# Patient Record
Sex: FEMALE | URBAN - METROPOLITAN AREA
[De-identification: names, ages, dates, MRNs, and addresses within clinical notes are randomized per-mention and may not be internally consistent; named-entity substitution may affect disease eponyms.]

---

## 2018-04-10 ENCOUNTER — RADIANT APPOINTMENT (OUTPATIENT)
Dept: GENERAL RADIOLOGY | Facility: CLINIC | Age: 48
End: 2018-04-10
Attending: FAMILY MEDICINE
Payer: COMMERCIAL

## 2018-04-10 ENCOUNTER — OFFICE VISIT (OUTPATIENT)
Dept: FAMILY MEDICINE | Facility: CLINIC | Age: 48
End: 2018-04-10
Payer: COMMERCIAL

## 2018-04-10 VITALS
HEART RATE: 78 BPM | OXYGEN SATURATION: 99 % | BODY MASS INDEX: 30.55 KG/M2 | DIASTOLIC BLOOD PRESSURE: 76 MMHG | RESPIRATION RATE: 18 BRPM | TEMPERATURE: 98.4 F | HEIGHT: 62 IN | WEIGHT: 166 LBS | SYSTOLIC BLOOD PRESSURE: 133 MMHG

## 2018-04-10 DIAGNOSIS — D27.0 BILATERAL DERMOID CYSTS OF OVARIES: Primary | ICD-10-CM

## 2018-04-10 DIAGNOSIS — M25.621 DECREASED RANGE OF MOTION OF RIGHT ELBOW: ICD-10-CM

## 2018-04-10 DIAGNOSIS — D27.1 BILATERAL DERMOID CYSTS OF OVARIES: Primary | ICD-10-CM

## 2018-04-10 DIAGNOSIS — S53.124S: ICD-10-CM

## 2018-04-10 NOTE — NURSING NOTE
name: Molly  Language: Peruvian   Agency: [facility]  Phone number: [phone]

## 2018-04-10 NOTE — MR AVS SNAPSHOT
After Visit Summary   4/10/2018    Kasandra Cameron    MRN: [id number]           Patient Information     Date Of Birth          1970        Visit Information        Provider Department      4/10/2018 9:00 AM Mayda Chamorro MD [facility]        Today's Diagnoses     Bilateral dermoid cysts of ovaries    -  1    Decreased range of motion of right elbow          Care Instructions    Here is the plan from today's visit    1. Bilateral dermoid cysts of ovaries  - OB/GYN REFERRAL - INTERNAL    2. Decreased range of motion of right elbow  - XR ELBOW RT G/E 3 VW; Future  - [facility] INTERNAL REFERRAL    Thank you for coming to [facility] today.  Lab Testing:  **If you had lab testing today and your results are reassuring or normal they will be mailed to you or sent through PowerbyProxi within 7 days.   **If the lab tests need quick action we will call you with the results.  The phone number we will call with results is # [phone] (home) . If this is not the best number please call our clinic and change the number.  Medication Refills:  If you need any refills please call your pharmacy and they will contact us.   If you need to  your refill at a new pharmacy, please contact the new pharmacy directly. The new pharmacy will help you get your medications transferred faster.   Scheduling:  If you have any concerns about today's visit or wish to schedule another appointment please call our office during normal business hours [phone] (8-5:00 M-F)  If a referral was made to a [facility] and you don't get a call from central scheduling please call [phone].  If a Mammogram was ordered for you at [facility] call [phone] to schedule or change your appointment.  If you had an XRay/CT/Ultrasound/MRI ordered the number is [phone] to schedule or change your radiology appointment.   Medical Concerns:  If you have  urgent medical concerns please call [phone] at any time of the day.    Mayda Chamorro MD            Follow-ups after your visit        Additional Services     OB/GYN REFERRAL - INTERNAL       Your provider has referred you to:  [facility]: [facility] [phone]   [email or web address]    Please be aware that coverage of these services is subject to the terms and limitations of your health insurance plan.  Call member services at your health plan with any benefit or coverage questions.      Please bring the following with you to your appointment:    (1) Any X-Rays, CTs or MRIs which have been performed.  Contact the facility where they were done to arrange for  prior to your scheduled appointment.   (2) List of current medications   (3) This referral request   (4) Any documents/labs given to you for this referral            [facility] INTERNAL REFERRAL       Reason: unable to flex right elbow  Urgency of Appointment: Next Available  Length of Problem: Chronic (3 months or longer since onset): Ordering Provider - please ensure that radiographs or imaging is available within the past 12 months or obtain new imaging if concern for bone or joint.  What are you requesting be done? Diagnosis and Management Recommendations                  Your next 10 appointments already scheduled     Apr 17, 2018 10:00 AM CDT   RETURN ORTHO with Estefany Cope MD   [facility] ([facility])    [address] [place],  [address]  [facility] [address]   [phone]            Apr 24, 2018  1:30 PM CDT   New Patient Visit with Eileen Hernandez MD   [facility] ([facility])    [address] [facility] [address]  [address]  [facility] [address] [phone]              Future tests that were ordered for you today     Open Future Orders      "   Priority Expected Expires Ordered    XR ELBOW RT G/E 3 VW Routine 4/10/2018 4/10/2019 4/10/2018            Who to contact     Please call your clinic at [phone] to:    Ask questions about your health    Make or cancel appointments    Discuss your medicines    Learn about your test results    Speak to your doctor            Additional Information About Your Visit        MyChart Information     ReInnervate is an electronic gateway that provides easy, online access to your medical records. With ReInnervate, you can request a clinic appointment, read your test results, renew a prescription or communicate with your care team.     To sign up for ReInnervate visit the website at [email or web address]   You will be asked to enter the access code listed below, as well as some personal information. Please follow the directions to create your username and password.     Your access code is: [email or web address]  Expires: 2018 10:11 AM     Your access code will  in 90 days. If you need help or a new code, please contact your [facility] or call [phone] for assistance.        Care EveryWhere ID     This is your Care EveryWhere ID. This could be used by other organizations to access your [place] medical records  [id number]        Your Vitals Were     Pulse Temperature Respirations Height Pulse Oximetry BMI (Body Mass Index)    78 98.4  F (36.9  C) (Oral) 18 5' 2\" (157.5 cm) 99% 30.36 kg/m2       Blood Pressure from Last 3 Encounters:   04/10/18 133/76    Weight from Last 3 Encounters:   04/10/18 166 lb (75.3 kg)              We Performed the Following     OB/GYN REFERRAL - INTERNAL     [facility] INTERNAL REFERRAL        Primary Care Provider Fax #    Physician No Ref-Primary [phone]       No address on file        Equal Access to Services     DELANO DUNBAR AH: Tremaine Quiroz, camilla nieto, yonas bhakta, candis deutsch " lazaire macias. So [facility] [phone].    ATENCIÓN: Si habla mario, tiene a whitaker disposición servicios gratuitos de asistencia lingüística. Amy al [phone].    We comply with applicable federal civil rights laws and [place] laws. We do not discriminate on the basis of race, color, national origin, age, disability, sex, sexual orientation, or gender identity.            Thank you!     Thank you for choosing [facility]  for your care. Our goal is always to provide you with excellent care. Hearing back from our patients is one way we can continue to improve our services. Please take a few minutes to complete the written survey that you may receive in the mail after your visit with us. Thank you!             Your Updated Medication List - Protect others around you: Learn how to safely use, store and throw away your medicines at www.disposemymeds.org.          This list is accurate as of 4/10/18 10:11 AM.  Always use your most recent med list.                   Brand Name Dispense Instructions for use Diagnosis    TYLENOL PO

## 2018-04-10 NOTE — PATIENT INSTRUCTIONS
Here is the plan from today's visit    1. Bilateral dermoid cysts of ovaries  - OB/GYN REFERRAL - INTERNAL    2. Decreased range of motion of right elbow  - XR ELBOW RT G/E 3 VW; Future  - [facility] INTERNAL REFERRAL    Thank you for coming to [facility] today.  Lab Testing:  **If you had lab testing today and your results are reassuring or normal they will be mailed to you or sent through Jamclouds within 7 days.   **If the lab tests need quick action we will call you with the results.  The phone number we will call with results is # [phone] (home) . If this is not the best number please call our clinic and change the number.  Medication Refills:  If you need any refills please call your pharmacy and they will contact us.   If you need to  your refill at a new pharmacy, please contact the new pharmacy directly. The new pharmacy will help you get your medications transferred faster.   Scheduling:  If you have any concerns about today's visit or wish to schedule another appointment please call our office during normal business hours [phone] (8-5:00 M-F)  If a referral was made to a [facility] and you don't get a call from central scheduling please call [phone].  If a Mammogram was ordered for you at [facility] call [phone] to schedule or change your appointment.  If you had an XRay/CT/Ultrasound/MRI ordered the number is [phone] to schedule or change your radiology appointment.   Medical Concerns:  If you have urgent medical concerns please call [phone] at any time of the day.    Mayda Chamorro MD      Patient is scheduled next Tuesday!           Thanks!     Maida     4/24/18

## 2018-04-11 PROBLEM — D27.0 BILATERAL DERMOID CYSTS OF OVARIES: Status: ACTIVE | Noted: 2018-04-11

## 2018-04-11 PROBLEM — D27.1 BILATERAL DERMOID CYSTS OF OVARIES: Status: ACTIVE | Noted: 2018-04-11

## 2018-04-11 PROBLEM — S53.124S: Status: ACTIVE | Noted: 2018-04-11

## 2018-04-11 ASSESSMENT — ENCOUNTER SYMPTOMS
WHEEZING: 0
ARTHRALGIAS: 0
NECK PAIN: 0
NERVOUS/ANXIOUS: 0
DIZZINESS: 0
DYSURIA: 0
POLYDIPSIA: 0
NUMBNESS: 0
UNEXPECTED WEIGHT CHANGE: 0
ABDOMINAL PAIN: 0
NAUSEA: 0
VOMITING: 0
RHINORRHEA: 0
CONSTIPATION: 0
DYSPHORIC MOOD: 0
SHORTNESS OF BREATH: 0
WEAKNESS: 0
EYE PAIN: 0
COUGH: 0
SORE THROAT: 0
PALPITATIONS: 0
SLEEP DISTURBANCE: 0
BACK PAIN: 0
FEVER: 0
BRUISES/BLEEDS EASILY: 0
CHILLS: 0
HEADACHES: 0
DIARRHEA: 0

## 2018-04-16 ENCOUNTER — HEALTH MAINTENANCE LETTER (OUTPATIENT)
Age: 48
End: 2018-04-16

## 2018-04-17 ENCOUNTER — OFFICE VISIT (OUTPATIENT)
Dept: FAMILY MEDICINE | Facility: CLINIC | Age: 48
End: 2018-04-17
Payer: COMMERCIAL

## 2018-04-17 VITALS
TEMPERATURE: 98.4 F | HEART RATE: 75 BPM | DIASTOLIC BLOOD PRESSURE: 94 MMHG | WEIGHT: 165 LBS | RESPIRATION RATE: 16 BRPM | SYSTOLIC BLOOD PRESSURE: 139 MMHG | BODY MASS INDEX: 30.18 KG/M2 | OXYGEN SATURATION: 97 %

## 2018-04-17 DIAGNOSIS — S53.124S: Primary | ICD-10-CM

## 2018-04-17 NOTE — NURSING NOTE
used this visit:  Name: Jose BUITRAGO  Language: Turkmen  Agency: [facility]  Phone:[phone]  Minnie Westbrook CMA

## 2018-04-17 NOTE — MR AVS SNAPSHOT
After Visit Summary   4/17/2018    Kasandra Cameron    MRN: [id number]           Patient Information     Date Of Birth          1970        Visit Information        Provider Department      4/17/2018 10:00 AM Estefany Cope MD [facility]        Today's Diagnoses     Posterior dislocation of elbow, closed, right, sequela    -  1      Care Instructions    Here is the plan from today's visit    1. Posterior dislocation of elbow, closed, right, sequela  MRI of elbow, then to see an orthopedic hand surgeon to discuss all options.   - MR Elbow Right w/o Contrast; Future  - ORTHOPEDICS ADULT REFERRAL    Please call or return to clinic if your symptoms don't go away.    Follow up plan  Please make a clinic appointment for follow up with your primary physician Mayda Chamorro MD as needed.     Thank you for coming to [facility] today.  Lab Testing:  **If you had lab testing today and your results are reassuring or normal they will be mailed to you or sent through [email or web address] within 7 days.   **If the lab tests need quick action we will call you with the results.  The phone number we will call with results is # [phone] (home) . If this is not the best number please call our clinic and change the number.  Medication Refills:  If you need any refills please call your pharmacy and they will contact us.   If you need to  your refill at a new pharmacy, please contact the new pharmacy directly. The new pharmacy will help you get your medications transferred faster.   Scheduling:  If you have any concerns about today's visit or wish to schedule another appointment please call our office during normal business hours [phone] (8-5:00 M-F)  If a referral was made to a [facility] and you don't get a call from central scheduling please call [phone].  If a Mammogram was ordered for you at [facility] call [phone] to schedule or  change your appointment.  If you had an XRay/CT/Ultrasound/MRI ordered the number is [phone] to schedule or change your radiology appointment.   Medical Concerns:  If you have urgent medical concerns please call [phone] at any time of the day.    Esetfany Cope MD            Follow-ups after your visit        Additional Services     ORTHOPEDICS ADULT REFERRAL       Your provider has referred you to: PREFERRED PROVIDERS: [facility]    Please be aware that coverage of these services is subject to the terms and limitations of your health insurance plan.  Call member services at your health plan with any benefit or coverage questions.      Please bring the following to your appointment:    >>   Any x-rays, CTs or MRIs which have been performed.  Contact the facility where they were done to arrange for  prior to your scheduled appointment.    >>   List of current medications   >>   This referral request   >>   Any documents/labs given to you for this referral                  Your next 10 appointments already scheduled     Apr 24, 2018  1:30 PM CDT   New Patient Visit with Eileen Hernandez MD   [facility] ([facility])    [address] [facility] [address]  [address]  [facility] [id number] [phone]              Future tests that were ordered for you today     Open Future Orders        Priority Expected Expires Ordered    MR Elbow Right w/o Contrast Routine  4/17/2019 4/17/2018            Who to contact     Please call your clinic at [phone] to:    Ask questions about your health    Make or cancel appointments    Discuss your medicines    Learn about your test results    Speak to your doctor            Additional Information About Your Visit        Yillio Information     Yillio is an electronic gateway that provides easy, online access to your medical records. With Yillio, you can request a clinic appointment, read your test results,  renew a prescription or communicate with your care team.     To sign up for MyChart visit the website at [email or web address]   You will be asked to enter the access code listed below, as well as some personal information. Please follow the directions to create your username and password.     Your access code is: [email or web address]  Expires: 2018 10:11 AM     Your access code will  in 90 days. If you need help or a new code, please contact your [facility] or call [phone] for assistance.        Care EveryWhere ID     This is your Care EveryWhere ID. This could be used by other organizations to access your [place] medical records  [id number]        Your Vitals Were     Pulse Temperature Respirations Last Period Pulse Oximetry BMI (Body Mass Index)    75 98.4  F (36.9  C) (Oral) 16 2018 (Exact Date) 97% 30.18 kg/m2       Blood Pressure from Last 3 Encounters:   18 (!) 139/94   04/10/18 133/76    Weight from Last 3 Encounters:   18 165 lb (74.8 kg)   04/10/18 166 lb (75.3 kg)              We Performed the Following     ORTHOPEDICS ADULT REFERRAL        Primary Care Provider Office Phone # Fax #    Mayda Chamorro [phone]       [address] [facility] [address]        Equal Access to Services     DELANO DUNBAR AH: Hadii linda solo hadproo Sokristen, waaxda luqadaha, qaybta kaalmada yony, candis david . So [facility] [phone].    ATENCIÓN: Si habla español, tiene a whitaker disposición servicios gratuitos de asistencia lingüística. Amy al [phone].    We comply with applicable federal civil rights laws and [place] laws. We do not discriminate on the basis of race, color, national origin, age, disability, sex, sexual orientation, or gender identity.            Thank you!     Thank you for choosing [facility]  for your care. Our goal is always to provide you with excellent care.  Hearing back from our patients is one way we can continue to improve our services. Please take a few minutes to complete the written survey that you may receive in the mail after your visit with us. Thank you!             Your Updated Medication List - Protect others around you: Learn how to safely use, store and throw away your medicines at www.disposemymeds.org.          This list is accurate as of 4/17/18 10:44 AM.  Always use your most recent med list.                   Brand Name Dispense Instructions for use Diagnosis    TYLENOL PO

## 2018-04-21 ASSESSMENT — ENCOUNTER SYMPTOMS
FEVER: 0
COLOR CHANGE: 0
JOINT SWELLING: 0
NECK PAIN: 0
ARTHRALGIAS: 0

## 2018-04-22 NOTE — PROGRESS NOTES
HPI:       Kasandra Cameron is a 47 year old who presents for the following  Patient presents with:  Elbow Pain: R. elbow stiffness. Unable to flex and move it. x's 7 yrs due to a fall. Trying massages and stretching. Believes she dislocated it.No pain.  Results: x-ray results from 4-10-18      Difficulty moving joint      Onset: 7 years ago    Injury?  Yes Details: Patient fell from a bunk bed.  She fell onto her outstretched arm.    Description:   Location(s): Right elbow  Character: No pain.    Intensity: N/A    Progression of Symptoms: same    Accompanying Signs & Symptoms:  Other symptoms: none    History:   Previous similar pain: no      Worsened by    Overuse?: no  Morning Stiffness?:no    Alleviating factors:  Improved by: nothing  Therapies Tried and outcome: Nothing.  Patient never had surgery or reduction of elbow joint after injury.    A  was used for  this visit.      Problem, Medication and Allergy Lists were reviewed and are current.  Patient is an established patient of this clinic.         Review of Systems:   Review of Systems   Constitutional: Negative for fever.   Musculoskeletal: Negative for arthralgias, joint swelling and neck pain.   Skin: Negative for color change and rash.             Physical Exam:   No data found.    Body mass index is 30.18 kg/(m^2).  Vitals were reviewed and were normal     Physical Exam   Constitutional: She appears well-developed and well-nourished. No distress.   Musculoskeletal: She exhibits no edema.   Neurological: She is alert.   Skin: No rash noted. She is not diaphoretic.   Nursing note and vitals reviewed.    Right Elbow Exam     Tenderness   None    Range of Motion   Extension:  Abnormal  Flexion:      Normal  Pronation:   Abnormal  Supination: Abnormal    Muscle Strength   Wrist Extension: 5/5  Wrist Flexion:     5/5  :                   5/5  Pronation:           5/5  Supination:         5/5          Results:   3 views right  elbow radiographs 4/10/2018 10:40 AM     History: injury 7 years ago, unable to flex elbow; Decreased range of  motion of right elbow. As per chart review: Fell 7 years ago and had  pain initially pass in standard pain has been resolved. Patient  continue to have limited range of motion     Comparison: None     Findings:     AP and lateral views of the right elbow were obtained. Marked medial  and posteriorly dislocated radial head and is now medial to the ulna.  The ulna is also posteriorly dislocated relative to the trochlea.  Trochlear groove demonstrate surface irregularity. There are bony  fragments medial to the medial epicondyle and proximal to the  capitulum, likely from prior comminuted fracture from medial  epicondyle fracture.          Impression:  1. Radial head is marked medial and posteriorly dislocated and is now  medial to the ulna.  2. Posterior dislocation of ulna  3. Multiple bony fragments in the joint space likely resulted from  prior fracture. See above for detail     I have personally reviewed the examination and initial interpretation  and I agree with the findings.     HERON ADKINS MD    Assessment and Plan     1. Posterior dislocation of elbow, closed, right, sequela  Patient's physical exam and x-ray are consistent with a dislocation of the elbow, likely fracture at the time of injury, 7 years ago.  Patient would have benefited from a reduction of the dislocation after the incident.  However, given the length of time from injury to presentation, patient would likely need surgery.  Patient does not appear to have any compromise of nerve or blood supply to right hand.  However an MRI is ordered for further evaluation.  Patient is not sure if she would have a surgery, but would like to meet with an orthopedic surgeon to discuss options.  Patient works as a  at a hotel, and uses her upper extremity often, but has been doing well given significant decrease in range of motion.  Patient  does not need to follow-up with sports medicine clinic, but rather should discuss ongoing cares with her primary physician, orthopedics referral placed.  - MR Elbow Right w/o Contrast; Future  - ORTHOPEDICS ADULT REFERRAL  There are no discontinued medications.  Options for treatment and follow-up care were reviewed with the patient. Kasandra Cameron  engaged in the decision making process and verbalized understanding of the options discussed and agreed with the final plan.    Estefany Cope MD

## 2018-04-24 ENCOUNTER — OFFICE VISIT (OUTPATIENT)
Dept: OBGYN | Facility: CLINIC | Age: 48
End: 2018-04-24
Payer: COMMERCIAL

## 2018-04-24 ENCOUNTER — HOSPITAL ENCOUNTER (OUTPATIENT)
Dept: MRI IMAGING | Facility: CLINIC | Age: 48
Discharge: HOME | End: 2018-04-24
Attending: FAMILY MEDICINE | Admitting: OBSTETRICS & GYNECOLOGY
Payer: COMMERCIAL

## 2018-04-24 VITALS
SYSTOLIC BLOOD PRESSURE: 155 MMHG | HEART RATE: 73 BPM | WEIGHT: 165.5 LBS | BODY MASS INDEX: 30.46 KG/M2 | DIASTOLIC BLOOD PRESSURE: 90 MMHG | HEIGHT: 62 IN

## 2018-04-24 DIAGNOSIS — S53.124S: ICD-10-CM

## 2018-04-24 DIAGNOSIS — D27.1 DERMOID CYST OF BOTH OVARIES: Primary | ICD-10-CM

## 2018-04-24 DIAGNOSIS — D27.0 DERMOID CYST OF BOTH OVARIES: Primary | ICD-10-CM

## 2018-04-24 PROCEDURE — 73221 MRI JOINT UPR EXTREM W/O DYE: CPT | Mod: RT

## 2018-04-24 PROCEDURE — G0463 HOSPITAL OUTPT CLINIC VISIT: HCPCS

## 2018-04-24 PROCEDURE — T1013 SIGN LANG/ORAL INTERPRETER: HCPCS | Mod: U3,ZF

## 2018-04-24 RX ORDER — PHENAZOPYRIDINE HYDROCHLORIDE 100 MG/1
200 TABLET, FILM COATED ORAL ONCE
Status: CANCELLED | OUTPATIENT
Start: 2018-04-24 | End: 2018-04-24

## 2018-04-24 NOTE — PATIENT INSTRUCTIONS
Our clinic will call you with surgery dates in the next couple of days    Schedule a pre-op visit with your doctor and review your blood pressures    Talk to your work, and get any paperwork that may need to be completed prior to surgery

## 2018-04-24 NOTE — MR AVS SNAPSHOT
"              After Visit Summary   2018    Kasandra Cameron    MRN: [id number]           Patient Information     Date Of Birth          1970        Visit Information        Provider Department      2018 1:15 PM Brendan Stoddard Emily B, MD [facility]        Today's Diagnoses     Dermoid cyst of both ovaries    -  1      Care Instructions    Our clinic will call you with surgery dates in the next couple of days    Schedule a pre-op visit with your doctor and review your blood pressures    Talk to your work, and get any paperwork that may need to be completed prior to surgery          Follow-ups after your visit        Who to contact     Please call your clinic at [phone] to:    Ask questions about your health    Make or cancel appointments    Discuss your medicines    Learn about your test results    Speak to your doctor            Additional Information About Your Visit        MyChart Information     [employer] is an electronic gateway that provides easy, online access to your medical records. With [employer], you can request a clinic appointment, read your test results, renew a prescription or communicate with your care team.     To sign up for [place] [employer] visit the website at [email or web address]   You will be asked to enter the access code listed below, as well as some personal information. Please follow the directions to create your username and password.     Your access code is: [email or web address]  Expires: 2018 10:11 AM     Your access code will  in 90 days. If you need help or a new code, please contact your [facility] or call [phone] for assistance.        Care EveryWhere ID     This is your Care EveryWhere ID. This could be used by other organizations to access your [facility] medical records  [id number]        Your Vitals Were     Pulse Height Last Period BMI (Body Mass Index)          73 1.575 m (5' 2\") 2018 " (Exact Date) 30.27 kg/m2         Blood Pressure from Last 3 Encounters:   04/24/18 155/90   04/17/18 (!) 139/94   04/10/18 133/76    Weight from Last 3 Encounters:   04/24/18 75.1 kg (165 lb 8 oz)   04/17/18 74.8 kg (165 lb)   04/10/18 75.3 kg (166 lb)              We Performed the Following     Lauren-Operative Worksheet (Diagnostic Laparoscopy)        Primary Care Provider Office Phone # Fax #    Mayda Al Chamorro [phone]       [address] [facility] [address]        Equal Access to Services     [facility]: Hadii linda Quiroz, waurida gerson, qaybta kaalmada yony, candis david . So [facility] [phone].    ATENCIÓN: Si habla español, tiene a whitaker disposición servicios gratuitos de asistencia lingüística. Llame al [phone].    We comply with applicable federal civil rights laws and [place] laws. We do not discriminate on the basis of race, color, national origin, age, disability, sex, sexual orientation, or gender identity.            Thank you!     Thank you for choosing [facility]  for your care. Our goal is always to provide you with excellent care. Hearing back from our patients is one way we can continue to improve our services. Please take a few minutes to complete the written survey that you may receive in the mail after your visit with us. Thank you!             Your Updated Medication List - Protect others around you: Learn how to safely use, store and throw away your medicines at www.disposemymeds.org.          This list is accurate as of 4/24/18  3:30 PM.  Always use your most recent med list.                   Brand Name Dispense Instructions for use Diagnosis    TYLENOL PO

## 2018-04-24 NOTE — PROGRESS NOTES
"[facility]  Gynecology Visit    Reason for Consult:  Bilateral ovarian cysts  Consulting Provider: Ashtyn    HPI:    Kasandra Cameron is a 47 year old , here for consultation regarding bilateral ovarian cysts.  The patient was seen in the ED on 18 for acute onset LLQ abdominal pain.  At that time, CT of her abdomen and pelvis was obtained and revealed bilateral ovarian cysts (7.7 cm and 10.0 cm respectively) most consistent with dermoid.  Subsequently, she established care at [facility] on 4/10/18 as instructed who sent her for referral.     Today, the patient reports her pain is improved since being discharged home with bowel regimen. Denies any pain today.  No concerns.     GYN History  LMP: LMP of 18; previous was 18.  This is first occurrence.  Periods: Normal cycles with moderate flow   STD hx: Denies  Contraception: None, has never used. Patient does not desire future fertility.  Sexual activity: Not currently   Last pap: Two years ago in [place], denies history of abnormal pap smears    OBHx  Obstetric History       T3      L3     SAB0   TAB0   Ectopic0   Multiple0   Live Births3       # Outcome Date GA Lbr Ramón/2nd Weight Sex Delivery Anes PTL Lv   3 Term     M Vag-Spont   LIVE BIRTH   2 Term     F Vag-Spont   LIVE BIRTH   1 Term     F Vag-Spont   LIVE BIRTH        Past Medical History:   Diagnosis Date     Dermoid cyst of ovary      Diverticula of intestine      HTN (hypertension)       PSHx: Denies  Meds: Denies  Allergies:  NKDA    SocHx: Denies tobacco, ETOH or drug use.  Works as a  at a hotel.  Lives with spouse and three children.     FamHx: Denies history of cancer. Denies any history of bleeding or clotting disorders, no adverse reactions to anesthesia.      ROS: No changes in weight, N/V, changes in appetite     Physical Exam  /90  Pulse 73  Ht 1.575 m (5' 2\")  Wt 75.1 kg (165 lb 8 oz)  LMP 2018 (Exact Date)  BMI 30.27 " kg/m2  Gen: Well-appearing, NAD  HEENT: Normocephalic, atraumatic  CV:  Regular rate  Non-labored breathing.  Appropriate inspiratory effort.  Abd: Soft, non-tender, non-distended.  No discrete pelvic masses appreciated given body habitus.   Ext: No LE edema, extremities warm and well perfused    Pelvic:  Patient declines    Imaging:   History: Left lower quadrant pain; history of colitis.    Technique: Volumetric helical acquisition of CT images from the lung bases through the symphysis pubis after the administration of intravenous contrast.    DOSE:    CTDIvol = 6.7 mGy.       Total DLP = 380 mGy*cm.      Contrast Media: IOHEXOL 350 MG/ML IV SOLUTION  Dose: 100 mL  Route: IV Push     Findings:     There are 2 fat-containing masses present in the pelvis, one anterior to the uterus and the other superior to the uterus as seen on axial images 125 and 107 respectively. The mass anterior to the uterus measures 6.4 x 7.7 x 6.6 cm and the mass superior to the uterus measures 7.2 x 10.0 x 8.3 cm. The mass superior to the uterus appears to originate from the left ovary and the mass anterior to the uterus appears to arise from the right ovary.    Liver: Within normal limits    Gallbladder and biliary tree: No calcified gallstones. No intra- or extrahepatic biliary ductal dilation.    Pancreas: Pancreatic body and tail appears atrophic with chunky ductal calcification (series 202, image 45 and 46)    Spleen: Within normal limits    Adrenals: Within normal limits.    Kidneys, ureters and bladder: Within normal limits.     Bowel: Normal caliber small bowel and large bowel. Diverticulosis with no evidence of diverticulitis.    Lymph nodes: No enlarged lymph nodes    Peritoneum: No ascites or free air. No other fluid collection.    Vessels: Aorta and major branches are patent without aneurysm or significant stenosis. Portal vein and superior mesenteric vein are patent.    Skeletal structures: Mild degenerative changes.    Dermoids  Lung bases: Clear.    Labs:   AFP - 2.8  Hgb - 11.6    Assessment/Plan:  Kasandra Cameron is a 47 year old  female here for consultation regarding bilateral ovarian cysts, likely dermoid given adipose tissue. AFP tumor marker within normal limits as is inhibin A.  Given size and contents of ovarian cysts, discussed they are unlikely to resolve on their own thus, we would recommend surgical management via diagnostic laparoscopy, bilateral cystectomy, bilateral salpingectomy possible bilateral oophorectomy (would prefer vs. Leaving any cyst behind), possible laparotomy, pap smear, possible cystoscopy.  The risks, benefits, and alternatives of the procedure were discussed, including the risks of bleeding, infection, injury to surrounding organs, and remote risks of bilateral oophorectomy which would place her in surgical menopause.  Discussed in the rare event this would happen, that we could offer her HRT pending her symptoms. She agrees that she would like everything done to remove the cysts, even if it includes an open procedure and/or bilateral oophorectomy. Discussed rare chance of malignancy which in that event, would require additional surgery. She verbally consented to a blood transfusion in the event of a life threatening amount of bleeding. Surgical consent will be signed on the day of surgery. Pre-op orders placed. The patient was instructed to follow-up at [facility] for pre-op clearance, in particular to address her uncontrolled HTN.  Lastly, regarding timing the patient is open. She will talk to her employer regarding leave and paperwork.     This visit was conducted with an in-person .     Mala Naik MD  OB/Gyn, PGY-4  2018    The Patient was seen in [facility] by    MALA FIGUEREDO CLEOPATRA.  I reviewed the history & exam. Assessment and plan were jointly made.    Shanti Austin MD

## 2018-04-30 ENCOUNTER — TELEPHONE (OUTPATIENT)
Dept: OBGYN | Facility: CLINIC | Age: 48
End: 2018-04-30

## 2018-04-30 NOTE — TELEPHONE ENCOUNTER
Confirmed surgery (int) 5/18/18 with arrival time at 5:30a.m with nothing to eat eight hours before scheduled surgery time and clear liquids up to two hours before scheduled surgery time, pt informed that pre op physical is needed before surgery and that a map and letter will be mailed out.     to complete the following fields:            CHECKLIST     Google Calendar : Yes     Resident notified:Not Applicable     Clinic schedule blocked:  Not Applicable    Patient notified:Yes      Pre op information sent: Yes     Given to patient over the phone.Yes    Comments:

## 2018-05-11 ENCOUNTER — OFFICE VISIT (OUTPATIENT)
Dept: FAMILY MEDICINE | Facility: CLINIC | Age: 48
End: 2018-05-11
Payer: COMMERCIAL

## 2018-05-11 VITALS
HEART RATE: 80 BPM | TEMPERATURE: 98 F | SYSTOLIC BLOOD PRESSURE: 137 MMHG | HEIGHT: 62 IN | DIASTOLIC BLOOD PRESSURE: 87 MMHG | BODY MASS INDEX: 30.18 KG/M2 | WEIGHT: 164 LBS | OXYGEN SATURATION: 97 %

## 2018-05-11 DIAGNOSIS — Z01.818 PREOP GENERAL PHYSICAL EXAM: Primary | ICD-10-CM

## 2018-05-11 DIAGNOSIS — D50.9 IRON DEFICIENCY ANEMIA, UNSPECIFIED IRON DEFICIENCY ANEMIA TYPE: ICD-10-CM

## 2018-05-11 DIAGNOSIS — R03.0 TRANSIENT ELEVATED BLOOD PRESSURE: ICD-10-CM

## 2018-05-11 LAB
ALBUMIN SERPL-MCNC: 4.5 MG/DL (ref 3.8–5)
ALP SERPL-CCNC: 78.3 U/L (ref 31.7–110.5)
ALT SERPL-CCNC: 18.8 U/L (ref 0–45)
AST SERPL-CCNC: 19.7 U/L (ref 0–45)
BASOPHILS # BLD AUTO: 0 10E9/L (ref 0–0.2)
BASOPHILS NFR BLD AUTO: 0.2 %
BILIRUB SERPL-MCNC: 0.5 MG/DL (ref 0.2–1.3)
BUN SERPL-MCNC: 15 MG/DL (ref 7–19)
CALCIUM SERPL-MCNC: 9 MG/DL (ref 8.5–10.1)
CHLORIDE SERPLBLD-SCNC: 97.3 MMOL/L (ref 98–110)
CO2 SERPL-SCNC: 29.5 MMOL/L (ref 20–32)
CREAT SERPL-MCNC: 0.6 MG/DL (ref 0.5–1)
DIFFERENTIAL METHOD BLD: ABNORMAL
EOSINOPHIL # BLD AUTO: 0.1 10E9/L (ref 0–0.7)
EOSINOPHIL NFR BLD AUTO: 2.3 %
ERYTHROCYTE [DISTWIDTH] IN BLOOD BY AUTOMATED COUNT: 15.3 % (ref 10–15)
FERRITIN SERPL-MCNC: 3 NG/ML (ref 8–252)
GFR SERPL CREATININE-BSD FRML MDRD: >90 ML/MIN/1.7 M2
GLUCOSE SERPL-MCNC: 104.1 MG'DL (ref 70–99)
HCT VFR BLD AUTO: 33.7 % (ref 35–47)
HEMOGLOBIN: 11.1 G/DL (ref 11.7–15.7)
HGB BLD-MCNC: 10.8 G/DL (ref 11.7–15.7)
IMM GRANULOCYTES # BLD: 0 10E9/L (ref 0–0.4)
IMM GRANULOCYTES NFR BLD: 0.2 %
IRON SATN MFR SERPL: 6 % (ref 15–46)
IRON SERPL-MCNC: 24 UG/DL (ref 35–180)
LYMPHOCYTES # BLD AUTO: 1.6 10E9/L (ref 0.8–5.3)
LYMPHOCYTES NFR BLD AUTO: 27.3 %
MCH RBC QN AUTO: 24.9 PG (ref 26.5–33)
MCHC RBC AUTO-ENTMCNC: 32 G/DL (ref 31.5–36.5)
MCV RBC AUTO: 78 FL (ref 78–100)
MONOCYTES # BLD AUTO: 0.4 10E9/L (ref 0–1.3)
MONOCYTES NFR BLD AUTO: 6.1 %
NEUTROPHILS # BLD AUTO: 3.7 10E9/L (ref 1.6–8.3)
NEUTROPHILS NFR BLD AUTO: 63.9 %
NRBC # BLD AUTO: 0 10*3/UL
NRBC BLD AUTO-RTO: 0 /100
PLATELET # BLD AUTO: 316 10E9/L (ref 150–450)
POTASSIUM SERPL-SCNC: 3.9 MMOL/DL (ref 3.3–4.5)
PROT SERPL-MCNC: 7.3 G/DL (ref 6.8–8.8)
RBC # BLD AUTO: 4.34 10E12/L (ref 3.8–5.2)
SODIUM SERPL-SCNC: 133.5 MMOL/L (ref 132.6–141.4)
TIBC SERPL-MCNC: 438 UG/DL (ref 240–430)
TRANSFERRIN SERPL-MCNC: 342 MG/DL (ref 210–360)
WBC # BLD AUTO: 5.8 10E9/L (ref 4–11)

## 2018-05-11 RX ORDER — FERROUS GLUCONATE 324(38)MG
324 TABLET ORAL
Qty: 30 TABLET | Refills: 2 | Status: ON HOLD | OUTPATIENT
Start: 2018-05-11 | End: 2018-05-18

## 2018-05-11 NOTE — Clinical Note
Will you call patient and notify regarding low ferritin/iron levels?  I prescribed ferrous gluconate, 1 tablet daily and recommend follow-up in 8-12 weeks for recheck of Hgb and ferritin level.    Thank you! -Shiela

## 2018-05-11 NOTE — LETTER
RETURN TO WORK FORM    5/11/2018    Re: Kasandra Cameron  1970      To Whom It May Concern:      Kasandra Cameron was seen in clinic today for preoperative examination and has scheduled surgery for 5/18/18.  She may return to work without restrictions on 5/13/2/18 ; however, will need a leave after her surgery (time off to be determined by surgeon).           CHARLINE Mijares CNP

## 2018-05-11 NOTE — NURSING NOTE
Due to patient being non-English speaking/uses sign language, an  was used for this visit. Only for face-to-face interpretation by an external agency, date and length of interpretation can be found on the scanned worksheet.     name: daquan Rivera  Agency: [employer]  Language: Nepali   Telephone number:  [phone]  Type of interpretation: Face-to-face, spoken

## 2018-05-11 NOTE — MR AVS SNAPSHOT
After Visit Summary   5/11/2018    Kasandra Cameron    MRN: [id number]           Patient Information     Date Of Birth          1970        Visit Information        Provider Department      5/11/2018 9:00 AM Shiela Wheat APRN CNP [facility]        Today's Diagnoses     Preop general physical exam    -  1    Anemia, unspecified type        Transient elevated blood pressure          Care Instructions    Here is the plan from today's visit    1. Preop general physical exam  - Hemoglobin (HGB) ([facility])  Results for orders placed or performed in visit on 05/11/18   Hemoglobin (HGB) ([facility])   Result Value Ref Range    Hemoglobin 11.1 (L) 11.7 - 15.7 g/dL       2. Anemia, unspecified type  - IRON AND IRON BINDING CAPACITY  - Ferritin  - Transferrin  - CBC with platelets differential    3. Elevated blood pressure  Continue to monitor blood pressure  - Comprehensive Metabolic Panel ([facility])    Follow-up in clinic after surgery in 1-2 months for recheck of blood pressure, sooner as needed.      Thank you for coming to [facility] today.  Lab Testing:  **If you had lab testing today and your results are reassuring or normal they will be mailed to you or sent through [employer] within 7 days.   **If the lab tests need quick action we will call you with the results.  The phone number we will call with results is # [phone] (home) . If this is not the best number please call our clinic and change the number.  Medication Refills:  If you need any refills please call your pharmacy and they will contact us.   If you need to  your refill at a new pharmacy, please contact the new pharmacy directly. The new pharmacy will help you get your medications transferred faster.   Scheduling:  If you have any concerns about today's visit or wish to schedule another appointment please call our office during normal business hours [phone] (8-5:00 M-F)  If a referral was made  to a [facility] and you don't get a call from central scheduling please call [phone].  If a Mammogram was ordered for you at [facility] call [phone] to schedule or change your appointment.  If you had an XRay/CT/Ultrasound/MRI ordered the number is [phone] to schedule or change your radiology appointment.   Medical Concerns:  If you have urgent medical concerns please call [phone] at any time of the day.  If you have a medical emergency please call [phone].    Presurgery Checklist  You are scheduled to have surgery. The healthcare staff will try to make your stay comfortable. Use the guidelines below to remind yourself what to do before surgery. Be sure to follow any specific pre-op instructions from your surgeon or nurse.   Preparing for Surgery  Ask your surgeon if you ll need a blood transfusion during surgery and if so, how to prepare for it. In some cases, you can donate blood before surgery. If needed, this blood can be given back (transfused) to you during or after surgery.  If you are having abdominal surgery, ask what you need to do to clear your bowel.  Tell your surgeon if you have allergies to any medications or foods.  Arrange for an adult family member or friend to drive you home after surgery. If possible, have someone ready to help you at home as you recover.  Call the surgeon if you get a cold, fever, sore throat, diarrhea, or other health problem just before surgery. Your surgeon can decide whether or not to postpone the surgery.  Medications  Tell your surgeon about all medications you take, including prescription and over-the-counter products such as herbal remedies and vitamins. Ask if you should continue taking them.  If you take ibuprofen, naproxen, or  blood thinners  such as aspirin, clopidogrel (Plavix), or warfarin (Coumadin), ask your surgeon whether you should stop taking them and how long before surgery you should stop.  You may be told to  take antibiotics just before surgery to prevent infection. If so, follow instructions carefully on how to take them.  If you are told to take medications called anticoagulants to prevent blood clots after surgery, be sure to follow the instructions on how to take them.  Stop Smoking  If you smoke, healing may take longer. So at least 2 week(s) before surgery, stop smoking.  Bathing or Showering Before Surgery  If instructed, wash with antibacterial soap. Afterward, do not use lotions or powders.  If you are having surgery on the head, you may be asked to shampoo with antibacterial soap. Follow instructions for doing so.  Do Not Remove Hair from the Surgery Site  Do not shave hair from the incision site, unless you are given specific instructions to do so. Usually, if hair needs to be removed, it will be done at the hospital right before surgery.  Don t Eat or Drink  Your doctor will tell you when to stop eating and drinking. If you do not follow your doctor's instructions, your procedure may be postponed or rescheduled for another day.  If your surgeon tells you to continue any medications, take them with small sips of water.  You can brush your teeth and rinse your mouth, but don t swallow any water.  Day of Surgery  Do not wear makeup. Do not use perfume, deodorant, or hairspray. Remove nail polish and artificial nails.  Leave jewelry (including rings), watches, and other valuables at home.  Be sure to bring health insurance cards or forms and a photo ID.  Bring a list of your medications (include the name, dose, how often you take them, and the time last dose was taken).  Arrive on time at the hospital or surgery facility.          Follow-ups after your visit        Your next 10 appointments already scheduled     May 18, 2018   Procedure with Shelby Branch MD   [facility], [place], Same Day Surgery (--)    [address] [facility] [address]   [phone]            Jun 01, 2018 10:45 AM CDT   Return Visit  "with Shanti Austin MD   [facility] ([facility])    [facility] [address]  [address]  [facility] [address] [phone]              Who to contact     Please call your clinic at [phone] to:    Ask questions about your health    Make or cancel appointments    Discuss your medicines    Learn about your test results    Speak to your doctor            Additional Information About Your Visit        MediKeeperharSellywhere Information     [employer] is an electronic gateway that provides easy, online access to your medical records. With [employer], you can request a clinic appointment, read your test results, renew a prescription or communicate with your care team.     To sign up for [employer] visit the website at [email or web address]   You will be asked to enter the access code listed below, as well as some personal information. Please follow the directions to create your username and password.     Your access code is: [email or web address]  Expires: 2018 10:11 AM     Your access code will  in 90 days. If you need help or a new code, please contact your [facility] or call [phone] for assistance.        Care EveryWhere ID     This is your Care EveryWhere ID. This could be used by other organizations to access your [facility] medical records  [id number]        Your Vitals Were     Pulse Temperature Height Last Period Pulse Oximetry BMI (Body Mass Index)    80 98  F (36.7  C) (Oral) 5' 2\" (157.5 cm) 2018 97% 30 kg/m2       Blood Pressure from Last 3 Encounters:   18 137/87   18 155/90   18 (!) 139/94    Weight from Last 3 Encounters:   18 164 lb (74.4 kg)   18 165 lb 8 oz (75.1 kg)   18 165 lb (74.8 kg)              We Performed the Following     CBC with platelets differential     Comprehensive Metabolic Panel ([facility])     Ferritin     Hemoglobin (HGB) ([facility])     IRON AND " IRON BINDING CAPACITY     Transferrin        Primary Care Provider Office Phone # Fax #    Mayda Al Chamorro [phone]       [address] [facility] [address]        Equal Access to Services     SHERYLDAE MANJINDER : Hadankit linda solo filipeo Gabriella, waurida luqadaha, qaybta kaalmada yony, candis bravo chancesu deutsch joann macias. So [facility] [phone].    ATENCIÓN: Si habla español, tiene a whitaker disposición servicios gratuitos de asistencia lingüística. Llame al [phone].    We comply with applicable federal civil rights laws and [place] laws. We do not discriminate on the basis of race, color, national origin, age, disability, sex, sexual orientation, or gender identity.            Thank you!     Thank you for choosing [facility]  for your care. Our goal is always to provide you with excellent care. Hearing back from our patients is one way we can continue to improve our services. Please take a few minutes to complete the written survey that you may receive in the mail after your visit with us. Thank you!             Your Updated Medication List - Protect others around you: Learn how to safely use, store and throw away your medicines at www.disposemymeds.org.          This list is accurate as of 5/11/18  9:46 AM.  Always use your most recent med list.                   Brand Name Dispense Instructions for use Diagnosis    TYLENOL PO

## 2018-05-11 NOTE — PATIENT INSTRUCTIONS
Here is the plan from today's visit    1. Preop general physical exam  - Hemoglobin (HGB) ([facility])  Results for orders placed or performed in visit on 05/11/18   Hemoglobin (HGB) ([facility])   Result Value Ref Range    Hemoglobin 11.1 (L) 11.7 - 15.7 g/dL       2. Anemia, unspecified type  - IRON AND IRON BINDING CAPACITY  - Ferritin  - Transferrin  - CBC with platelets differential    3. Elevated blood pressure  Continue to monitor blood pressure  - Comprehensive Metabolic Panel ([facility])    Follow-up in clinic after surgery in 1-2 months for recheck of blood pressure, sooner as needed.      Thank you for coming to [facility] today.  Lab Testing:  **If you had lab testing today and your results are reassuring or normal they will be mailed to you or sent through [facility] within 7 days.   **If the lab tests need quick action we will call you with the results.  The phone number we will call with results is # [phone] (home) . If this is not the best number please call our clinic and change the number.  Medication Refills:  If you need any refills please call your pharmacy and they will contact us.   If you need to  your refill at a new pharmacy, please contact the new pharmacy directly. The new pharmacy will help you get your medications transferred faster.   Scheduling:  If you have any concerns about today's visit or wish to schedule another appointment please call our office during normal business hours [phone] (8-5:00 M-F)  If a referral was made to a [facility] and you don't get a call from central scheduling please call [phone].  If a Mammogram was ordered for you at [facility] call [phone] to schedule or change your appointment.  If you had an XRay/CT/Ultrasound/MRI ordered the number is [phone] to schedule or change your radiology appointment.   Medical Concerns:  If you have urgent medical concerns please call [phone] at any time of the  day.  If you have a medical emergency please call 911.    Presurgery Checklist  You are scheduled to have surgery. The healthcare staff will try to make your stay comfortable. Use the guidelines below to remind yourself what to do before surgery. Be sure to follow any specific pre-op instructions from your surgeon or nurse.   Preparing for Surgery  Ask your surgeon if you ll need a blood transfusion during surgery and if so, how to prepare for it. In some cases, you can donate blood before surgery. If needed, this blood can be given back (transfused) to you during or after surgery.  If you are having abdominal surgery, ask what you need to do to clear your bowel.  Tell your surgeon if you have allergies to any medications or foods.  Arrange for an adult family member or friend to drive you home after surgery. If possible, have someone ready to help you at home as you recover.  Call the surgeon if you get a cold, fever, sore throat, diarrhea, or other health problem just before surgery. Your surgeon can decide whether or not to postpone the surgery.  Medications  Tell your surgeon about all medications you take, including prescription and over-the-counter products such as herbal remedies and vitamins. Ask if you should continue taking them.  If you take ibuprofen, naproxen, or  blood thinners  such as aspirin, clopidogrel (Plavix), or warfarin (Coumadin), ask your surgeon whether you should stop taking them and how long before surgery you should stop.  You may be told to take antibiotics just before surgery to prevent infection. If so, follow instructions carefully on how to take them.  If you are told to take medications called anticoagulants to prevent blood clots after surgery, be sure to follow the instructions on how to take them.  Stop Smoking  If you smoke, healing may take longer. So at least 2 week(s) before surgery, stop smoking.  Bathing or Showering Before Surgery  If instructed, wash with antibacterial  soap. Afterward, do not use lotions or powders.  If you are having surgery on the head, you may be asked to shampoo with antibacterial soap. Follow instructions for doing so.  Do Not Remove Hair from the Surgery Site  Do not shave hair from the incision site, unless you are given specific instructions to do so. Usually, if hair needs to be removed, it will be done at the hospital right before surgery.  Don t Eat or Drink  Your doctor will tell you when to stop eating and drinking. If you do not follow your doctor's instructions, your procedure may be postponed or rescheduled for another day.  If your surgeon tells you to continue any medications, take them with small sips of water.  You can brush your teeth and rinse your mouth, but don t swallow any water.  Day of Surgery  Do not wear makeup. Do not use perfume, deodorant, or hairspray. Remove nail polish and artificial nails.  Leave jewelry (including rings), watches, and other valuables at home.  Be sure to bring health insurance cards or forms and a photo ID.  Bring a list of your medications (include the name, dose, how often you take them, and the time last dose was taken).  Arrive on time at the hospital or surgery facility.

## 2018-05-11 NOTE — PROGRESS NOTES
[facility]  [address] [place] [address],  [address]  [facility] [address]  Phone: [phone]  Fax: [phone]    5/11/2018    Adult PRE-OP Evaluation:    Kasandra Cameron, 1970 presents for pre-operative evaluation and assessment as requested by Dr. Branch, prior to undergoing surgery/procedure for treatment of  Dermoid cysts of bilateral ovaries.      Proposed procedure: diagnostic laparoscopy, bilateral cystectomy, bilateral salpingectomy, possible bilateral oophorectomy, possible laparotomy, pap smear and possible cystoscopy    Date of Surgery/ Procedure: 5/18/18  Hospital/Surgical Facility:    [facility]      3rd Floor      Fax: [phone]      Phone: [phone]     Primary Physician: Mayda Chamorro  Type of Anesthesia Anticipated: General  History of anesthesia complications: NONE  History of  abnormal bleeding: NONE   History of blood transfusions: NO  Patient has a Health Care Directive or Living Will:  NO    Preoperative Questions   1. NO - Do you have a history of heart attack, stroke, stent, bypass or surgery on an artery in the head, neck, heart or legs?  2. NO - Do you ever have any pain or discomfort in your chest?  3. NO - Have you ever had a severe pain across the front of your chest lasting for half an hour or more?  4. NO - Do you have a history of Congestive Heart Failure?  5. NO - Are you troubled by shortness of breath when: walking on the level/ up a slight hill/ at night?  6. NO - Does your chest ever sound wheezy or whistling?  7. NO - Do you currently have a cold, bronchitis or other respiratory infection?  8. NO - Have you had a cold, bronchitis or other respiratory infection within the last 2 weeks?  9. NO - Do you usually have a cough?  10. NO - Do you sometimes get pains in the calves of your legs when you walk?  11. NO - Do you or anyone in your family have previous history of blood clots?  12. NO - Do you or does anyone in your  family have a serious bleeding problem such as prolonged bleeding following surgeries or cuts?  13. NO - Have you ever had problems with anemia or been told to take iron pills?  14. NO - Have you had any abnormal blood loss such as black, tarry or bloody stools, or abnormal vaginal bleeding?  15. NO - Have you ever had a blood transfusion?  16. NO - Have you or any of your relatives ever had problems with anesthesia?  17. NO - Do you have sleep apnea, excessive snoring or daytime drowsiness?  18. NO - Do you have any prosthetic heart valves?  19. NO - Do you have prosthetic joints?  20. NO - Is there any chance that you may be pregnant?    Patient Active Problem List   Diagnosis     Bilateral dermoid cysts of ovaries     Posterior dislocation of elbow, closed, right, sequela       Both mother and father with Hypertension.        Current Outpatient Prescriptions on File Prior to Visit:  Acetaminophen (TYLENOL PO)      No current facility-administered medications on file prior to visit.     OTC products: None, except as noted above    No Known Allergies  Latex Allergy: NO    Social History     Social History     Marital status:      Spouse name: N/A     Number of children: N/A     Years of education: N/A     Social History Main Topics     Smoking status: Never Smoker     Smokeless tobacco: Never Used     Alcohol use None     Drug use: None     Sexual activity: Not Asked     Other Topics Concern     None     Social History Narrative    Lives with 3 children and . 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)       REVIEW OF SYSTEMS:   CONSTITUTIONAL: NEGATIVE for fever, chills, change in weight  INTEGUMENTARY/SKIN: NEGATIVE for worrisome rashes, moles or lesions  EYES: NEGATIVE for vision changes or irritation  ENT/MOUTH: NEGATIVE for ear, mouth and throat problems  RESP: NEGATIVE for significant cough or SOB  BREAST: NEGATIVE for masses, tenderness or discharge  CV: NEGATIVE for chest pain, palpitations or peripheral  "edema  GI: NEGATIVE for nausea, abdominal pain, heartburn, or change in bowel habits  : NEGATIVE for frequency, dysuria, or hematuria  MUSCULOSKELETAL: NEGATIVE for significant arthralgias or myalgia  NEURO: NEGATIVE for weakness, dizziness or paresthesias  ENDOCRINE: NEGATIVE for temperature intolerance, skin/hair changes  HEME: NEGATIVE for bleeding problems  PSYCHIATRIC: NEGATIVE for changes in mood or affect    EXAM:     Patient Vitals for the past 24 hrs:   BP Temp Temp src Pulse SpO2 Height Weight   05/11/18 0908 137/87 98  F (36.7  C) Oral 80 97 % 5' 2\" (157.5 cm) 164 lb (74.4 kg)     Body mass index is 30 kg/(m^2).  GENERAL: healthy, alert and no distress  EYES: Eyes grossly normal to inspection, extraocular movements - intact, and PERRL  HENT: ear canals- normal; TMs- normal; Nose- normal; Mouth- no ulcers, no lesions  NECK: no tenderness, no adenopathy, no asymmetry, no masses, no stiffness  RESP: lungs clear to auscultation - no rales, no rhonchi, no wheezes  CV: regular rates and rhythm, normal S1 S2, no S3 or S4 and no murmur  ABDOMEN: soft, suprapubic/lower abdomen is tender to palpation, no  hepatosplenomegaly, no masses, normal bowel sounds  MS: extremities- no gross deformities noted, no edema  SKIN: no suspicious lesions, no rashes  NEURO: strength and tone- normal, sensory exam- grossly normal, mentation- intact, speech- normal, reflexes- symmetric  BACK: no CVA tenderness, no paralumbar tenderness  PSYCH: Alert and oriented times 3; speech- coherent , normal rate and volume; able to articulate logical thoughts  LYMPHATICS: ant. cervical- normal, post. cervical- normal    DIAGNOSTICS:      EKG: Not indicated due to non-vascular surgery and low risk of event (age <65 and without cardiac risk factors)  Hgb  CMP due to history of elevated BP    RISK ASSESSMENT:     Cardiovascular Risk:  -Patient is able to participate in strenuous activities without chest pain.  -The patient does not have chest " pain with exertion.  -Patient does not have a history of congestive heart failure.    -The patient does not have a history of stroke and does not have a history of valvular disease.    Pulmonary Risk:  -In terms of risk factors for pulmonary complication, the patient has no risk factors    Perioperative Complications:  -The patient does not have a history of bleeding or clotting problems in the past.    -The patient has not had surgery previously.    -The patient does not have a family history of any anesthesia or surgical complications.      IMPRESSION:   Reason for surgery/procedure: Dermoid cysts of both ovaries    The proposed surgical procedure is considered INTERMEDIATE risk.    For above listed surgery and anesthesia:   Patient is at low risk for surgery/procedure and perioperative/procedure complications.    RECOMMENDATIONS:     Kasandra was seen today for pre-op exam.    Diagnoses and all orders for this visit:    Preop general physical exam  -     Hemoglobin (HGB) ([facility])    Iron deficiency anemia, unspecified iron deficiency anemia type  -     IRON AND IRON BINDING CAPACITY  -     Ferritin  -     Transferrin  -     CBC with platelets differential  -     Start ferrous gluconate, 324 mg 1 tablet daily.    -     Recheck Hgb and ferritin in 8-12 weeks.      Elevated blood pressure  -     Comprehensive Metabolic Panel ([facility])  -     Normal reading in clinic today, self-reported history of anxiety with clinic visits.  Continue to monitor due to strong family history of hypertension.            Labs:  Results for orders placed or performed in visit on 05/11/18   Hemoglobin (HGB) ([facility])   Result Value Ref Range    Hemoglobin 11.1 (L) 11.7 - 15.7 g/dL   IRON AND IRON BINDING CAPACITY   Result Value Ref Range    Iron 24 (L) 35 - 180 ug/dL    Iron Binding Cap 438 (H) 240 - 430 ug/dL    Iron Saturation Index 6 (L) 15 - 46 %   Ferritin   Result Value Ref Range    Ferritin 3 (L) 8 - 252 ng/mL   Transferrin    Result Value Ref Range    Transferrin 342 210 - 360 mg/dL   CBC with platelets differential   Result Value Ref Range    WBC 5.8 4.0 - 11.0 10e9/L    RBC Count 4.34 3.8 - 5.2 10e12/L    Hemoglobin 10.8 (L) 11.7 - 15.7 g/dL    Hematocrit 33.7 (L) 35.0 - 47.0 %    MCV 78 78 - 100 fl    MCH 24.9 (L) 26.5 - 33.0 pg    MCHC 32.0 31.5 - 36.5 g/dL    RDW 15.3 (H) 10.0 - 15.0 %    Platelet Count 316 150 - 450 10e9/L    Diff Method Automated Method     % Neutrophils 63.9 %    % Lymphocytes 27.3 %    % Monocytes 6.1 %    % Eosinophils 2.3 %    % Basophils 0.2 %    % Immature Granulocytes 0.2 %    Nucleated RBCs 0 0 /100    Absolute Neutrophil 3.7 1.6 - 8.3 10e9/L    Absolute Lymphocytes 1.6 0.8 - 5.3 10e9/L    Absolute Monocytes 0.4 0.0 - 1.3 10e9/L    Absolute Eosinophils 0.1 0.0 - 0.7 10e9/L    Absolute Basophils 0.0 0.0 - 0.2 10e9/L    Abs Immature Granulocytes 0.0 0 - 0.4 10e9/L    Absolute Nucleated RBC 0.0    Comprehensive Metabolic Panel ([facility])   Result Value Ref Range    Albumin 4.5 3.8 - 5.0 mg/dL    Alkaline Phosphatase 78.3 31.7 - 110.5 U/L    ALT 18.8 0.0 - 45.0 U/L    AST 19.7 0.0 - 45.0 U/L    Bilirubin Total 0.5 0.2 - 1.3 mg/dL    Urea Nitrogen 15.0 7.0 - 19.0 mg/dL    Calcium 9.0 8.5 - 10.1 mg/dL    Chloride 97.3 (L) 98.0 - 110.0 mmol/L    Carbon Dioxide 29.5 20.0 - 32.0 mmol/L    Creatinine 0.6 0.5 - 1.0 mg/dL    Glucose 104.1 (H) 70.0 - 99.0 mg'dL    Potassium 3.9 3.3 - 4.5 mmol/dL    Sodium 133.5 132.6 - 141.4 mmol/L    Protein Total 7.3 6.8 - 8.8 g/dL    GFR Estimate >90 >60.0 mL/min/1.7 m2    GFR Estimate If Black >90 >60.0 mL/min/1.7 m2         Fasting:  NPO for 12 hours prior to surgery    Preop Plan:  --Approval given to proceed with proposed procedure, without further diagnostic evaluation    Medications:  Patient should take their regular medications the morning of surgery unless otherwise instructed.          Shiela Wheat, CHARLINE CNP    Please contact our office if there are any further  questions or information required about this patient.

## 2018-05-11 NOTE — LETTER
May 15, 2018      Kasandra Cameron  [address]  [facility] [address]        Dear Kasandra,    Thank you for getting your care at [facility]. Please see below for your test results.  Your iron levels were low, indicating iron deficiency anemia.      I sent in a prescription for a daily iron supplement.  You should start this iron supplement once daily and then we will recheck your iron levels in 8-12 weeks.      Resulted Orders   Hemoglobin (HGB) ([facility])   Result Value Ref Range    Hemoglobin 11.1 (L) 11.7 - 15.7 g/dL   IRON AND IRON BINDING CAPACITY   Result Value Ref Range    Iron 24 (L) 35 - 180 ug/dL    Iron Binding Cap 438 (H) 240 - 430 ug/dL    Iron Saturation Index 6 (L) 15 - 46 %   Ferritin   Result Value Ref Range    Ferritin 3 (L) 8 - 252 ng/mL   Transferrin   Result Value Ref Range    Transferrin 342 210 - 360 mg/dL   CBC with platelets differential   Result Value Ref Range    WBC 5.8 4.0 - 11.0 10e9/L    RBC Count 4.34 3.8 - 5.2 10e12/L    Hemoglobin 10.8 (L) 11.7 - 15.7 g/dL    Hematocrit 33.7 (L) 35.0 - 47.0 %    MCV 78 78 - 100 fl    MCH 24.9 (L) 26.5 - 33.0 pg    MCHC 32.0 31.5 - 36.5 g/dL    RDW 15.3 (H) 10.0 - 15.0 %    Platelet Count 316 150 - 450 10e9/L    Diff Method Automated Method     % Neutrophils 63.9 %    % Lymphocytes 27.3 %    % Monocytes 6.1 %    % Eosinophils 2.3 %    % Basophils 0.2 %    % Immature Granulocytes 0.2 %    Nucleated RBCs 0 0 /100    Absolute Neutrophil 3.7 1.6 - 8.3 10e9/L    Absolute Lymphocytes 1.6 0.8 - 5.3 10e9/L    Absolute Monocytes 0.4 0.0 - 1.3 10e9/L    Absolute Eosinophils 0.1 0.0 - 0.7 10e9/L    Absolute Basophils 0.0 0.0 - 0.2 10e9/L    Abs Immature Granulocytes 0.0 0 - 0.4 10e9/L    Absolute Nucleated RBC 0.0    Comprehensive Metabolic Panel ([facility])   Result Value Ref Range    Albumin 4.5 3.8 - 5.0 mg/dL    Alkaline Phosphatase 78.3 31.7 - 110.5 U/L    ALT 18.8 0.0 - 45.0 U/L    AST 19.7 0.0 - 45.0 U/L    Bilirubin Total 0.5 0.2 - 1.3  mg/dL    Urea Nitrogen 15.0 7.0 - 19.0 mg/dL    Calcium 9.0 8.5 - 10.1 mg/dL    Chloride 97.3 (L) 98.0 - 110.0 mmol/L    Carbon Dioxide 29.5 20.0 - 32.0 mmol/L    Creatinine 0.6 0.5 - 1.0 mg/dL    Glucose 104.1 (H) 70.0 - 99.0 mg'dL    Potassium 3.9 3.3 - 4.5 mmol/dL    Sodium 133.5 132.6 - 141.4 mmol/L    Protein Total 7.3 6.8 - 8.8 g/dL    GFR Estimate >90 >60.0 mL/min/1.7 m2    GFR Estimate If Black >90 >60.0 mL/min/1.7 m2       If you have any concerns about these results please call and leave a message for me or send a MyChart message to the clinic.    Sincerely,    CHARLINE Mijares CNP

## 2018-05-14 ENCOUNTER — TELEPHONE (OUTPATIENT)
Dept: FAMILY MEDICINE | Facility: CLINIC | Age: 48
End: 2018-05-14

## 2018-05-14 DIAGNOSIS — D50.9 IRON DEFICIENCY ANEMIA, UNSPECIFIED IRON DEFICIENCY ANEMIA TYPE: ICD-10-CM

## 2018-05-14 NOTE — TELEPHONE ENCOUNTER
"Per Shiela Wheat \"Will you call patient and notify regarding low ferritin/iron levels?   I prescribed ferrous gluconate, 1 tablet daily and recommend follow-up in 8-12 weeks for recheck of Hgb and ferritin level.   Thank you! -Shiela \"    RN called pt via language line to relay message. Left message with pt's  to call clinic back    Cara Nash RN    "

## 2018-05-15 NOTE — TELEPHONE ENCOUNTER
Second phone call to patient using language line.  Voice mail did not  the line, unable to leave message.    Dianna Cerda RN

## 2018-05-16 NOTE — TELEPHONE ENCOUNTER
Third phone call to patient via the language line.   The phone number was disconnected.      Letter was sent to patient relaying information from provider.    Dianna Cerda RN

## 2018-05-18 ENCOUNTER — ANESTHESIA (OUTPATIENT)
Dept: SURGERY | Facility: CLINIC | Age: 48
End: 2018-05-18
Payer: COMMERCIAL

## 2018-05-18 ENCOUNTER — SURGERY (OUTPATIENT)
Age: 48
End: 2018-05-18

## 2018-05-18 ENCOUNTER — HOSPITAL ENCOUNTER (OUTPATIENT)
Facility: CLINIC | Age: 48
Discharge: HOME | End: 2018-05-18
Attending: OBSTETRICS & GYNECOLOGY | Admitting: OBSTETRICS & GYNECOLOGY
Payer: COMMERCIAL

## 2018-05-18 ENCOUNTER — ANESTHESIA EVENT (OUTPATIENT)
Dept: SURGERY | Facility: CLINIC | Age: 48
End: 2018-05-18
Payer: COMMERCIAL

## 2018-05-18 VITALS
SYSTOLIC BLOOD PRESSURE: 125 MMHG | TEMPERATURE: 98.6 F | HEIGHT: 60 IN | BODY MASS INDEX: 32.29 KG/M2 | WEIGHT: 164.46 LBS | DIASTOLIC BLOOD PRESSURE: 71 MMHG | OXYGEN SATURATION: 98 % | RESPIRATION RATE: 16 BRPM

## 2018-05-18 DIAGNOSIS — Z90.79 H/O BILATERAL SALPINGECTOMY: ICD-10-CM

## 2018-05-18 DIAGNOSIS — Z90.721 S/P LEFT OOPHORECTOMY: ICD-10-CM

## 2018-05-18 DIAGNOSIS — Z98.890 S/P OVARIAN CYSTECTOMY: ICD-10-CM

## 2018-05-18 DIAGNOSIS — Z87.42 S/P OVARIAN CYSTECTOMY: ICD-10-CM

## 2018-05-18 DIAGNOSIS — Z98.890 S/P LAPAROTOMY: Primary | ICD-10-CM

## 2018-05-18 LAB
ABO + RH BLD: NORMAL
ABO + RH BLD: NORMAL
B-HCG SERPL-ACNC: <1 IU/L (ref 0–5)
BLD GP AB SCN SERPL QL: NORMAL
BLOOD BANK CMNT PATIENT-IMP: NORMAL
GLUCOSE SERPL-MCNC: 116 MG/DL (ref 70–99)
HGB BLD-MCNC: 10.5 G/DL (ref 11.7–15.7)
SPECIMEN EXP DATE BLD: NORMAL

## 2018-05-18 PROCEDURE — 87624 HPV HI-RISK TYP POOLED RSLT: CPT | Performed by: OBSTETRICS & GYNECOLOGY

## 2018-05-18 PROCEDURE — 40000171 ZZH STATISTIC PRE-PROCEDURE ASSESSMENT III: Performed by: OBSTETRICS & GYNECOLOGY

## 2018-05-18 PROCEDURE — C9399 UNCLASSIFIED DRUGS OR BIOLOG: HCPCS | Performed by: NURSE ANESTHETIST, CERTIFIED REGISTERED

## 2018-05-18 PROCEDURE — 88307 TISSUE EXAM BY PATHOLOGIST: CPT | Performed by: OBSTETRICS & GYNECOLOGY

## 2018-05-18 PROCEDURE — 82947 ASSAY GLUCOSE BLOOD QUANT: CPT | Performed by: ANESTHESIOLOGY

## 2018-05-18 PROCEDURE — 86900 BLOOD TYPING SEROLOGIC ABO: CPT | Performed by: OBSTETRICS & GYNECOLOGY

## 2018-05-18 PROCEDURE — 25000128 H RX IP 250 OP 636: Performed by: ANESTHESIOLOGY

## 2018-05-18 PROCEDURE — 88302 TISSUE EXAM BY PATHOLOGIST: CPT | Performed by: OBSTETRICS & GYNECOLOGY

## 2018-05-18 PROCEDURE — 25000566 ZZH SEVOFLURANE, EA 15 MIN: Performed by: OBSTETRICS & GYNECOLOGY

## 2018-05-18 PROCEDURE — 84702 CHORIONIC GONADOTROPIN TEST: CPT | Performed by: ANESTHESIOLOGY

## 2018-05-18 PROCEDURE — 86901 BLOOD TYPING SEROLOGIC RH(D): CPT | Performed by: OBSTETRICS & GYNECOLOGY

## 2018-05-18 PROCEDURE — 86850 RBC ANTIBODY SCREEN: CPT | Performed by: OBSTETRICS & GYNECOLOGY

## 2018-05-18 PROCEDURE — 25000128 H RX IP 250 OP 636: Performed by: NURSE ANESTHETIST, CERTIFIED REGISTERED

## 2018-05-18 PROCEDURE — 25000132 ZZH RX MED GY IP 250 OP 250 PS 637: Performed by: OBSTETRICS & GYNECOLOGY

## 2018-05-18 PROCEDURE — 88302 TISSUE EXAM BY PATHOLOGIST: CPT | Mod: 26 | Performed by: OBSTETRICS & GYNECOLOGY

## 2018-05-18 PROCEDURE — 71000016 ZZH RECOVERY PHASE 1 LEVEL 3 FIRST HR: Performed by: OBSTETRICS & GYNECOLOGY

## 2018-05-18 PROCEDURE — 36000059 ZZH SURGERY LEVEL 3 EA 15 ADDTL MIN UMMC: Performed by: OBSTETRICS & GYNECOLOGY

## 2018-05-18 PROCEDURE — 88307 TISSUE EXAM BY PATHOLOGIST: CPT | Mod: 26 | Performed by: OBSTETRICS & GYNECOLOGY

## 2018-05-18 PROCEDURE — 71000027 ZZH RECOVERY PHASE 2 EACH 15 MINS: Performed by: OBSTETRICS & GYNECOLOGY

## 2018-05-18 PROCEDURE — 37000009 ZZH ANESTHESIA TECHNICAL FEE, EACH ADDTL 15 MIN: Performed by: OBSTETRICS & GYNECOLOGY

## 2018-05-18 PROCEDURE — 36415 COLL VENOUS BLD VENIPUNCTURE: CPT | Performed by: ANESTHESIOLOGY

## 2018-05-18 PROCEDURE — 88175 CYTOPATH C/V AUTO FLUID REDO: CPT | Performed by: OBSTETRICS & GYNECOLOGY

## 2018-05-18 PROCEDURE — C9290 INJ, BUPIVACAINE LIPOSOME: HCPCS | Performed by: ANESTHESIOLOGY

## 2018-05-18 PROCEDURE — 27210794 ZZH OR GENERAL SUPPLY STERILE: Performed by: OBSTETRICS & GYNECOLOGY

## 2018-05-18 PROCEDURE — 36000057 ZZH SURGERY LEVEL 3 1ST 30 MIN - UMMC: Performed by: OBSTETRICS & GYNECOLOGY

## 2018-05-18 PROCEDURE — 85018 HEMOGLOBIN: CPT | Performed by: ANESTHESIOLOGY

## 2018-05-18 PROCEDURE — 37000008 ZZH ANESTHESIA TECHNICAL FEE, 1ST 30 MIN: Performed by: OBSTETRICS & GYNECOLOGY

## 2018-05-18 PROCEDURE — 25000125 ZZHC RX 250: Performed by: ANESTHESIOLOGY

## 2018-05-18 RX ORDER — OXYCODONE HYDROCHLORIDE 5 MG/1
5-10 TABLET ORAL EVERY 6 HOURS PRN
Qty: 20 TABLET | Refills: 0 | Status: SHIPPED | OUTPATIENT
Start: 2018-05-18 | End: 2019-04-25

## 2018-05-18 RX ORDER — BUPIVACAINE HYDROCHLORIDE 2.5 MG/ML
INJECTION, SOLUTION EPIDURAL; INFILTRATION; INTRACAUDAL PRN
Status: DISCONTINUED | OUTPATIENT
Start: 2018-05-18 | End: 2018-05-18

## 2018-05-18 RX ORDER — CEFAZOLIN SODIUM 1 G/3ML
INJECTION, POWDER, FOR SOLUTION INTRAMUSCULAR; INTRAVENOUS PRN
Status: DISCONTINUED | OUTPATIENT
Start: 2018-05-18 | End: 2018-05-18

## 2018-05-18 RX ORDER — ONDANSETRON 4 MG/1
4 TABLET, ORALLY DISINTEGRATING ORAL EVERY 30 MIN PRN
Status: DISCONTINUED | OUTPATIENT
Start: 2018-05-18 | End: 2018-05-18 | Stop reason: HOSPADM

## 2018-05-18 RX ORDER — AMOXICILLIN 250 MG
1 CAPSULE ORAL 2 TIMES DAILY PRN
Qty: 60 TABLET | Refills: 1 | Status: SHIPPED | OUTPATIENT
Start: 2018-05-18 | End: 2019-04-25

## 2018-05-18 RX ORDER — NALOXONE HYDROCHLORIDE 0.4 MG/ML
.1-.4 INJECTION, SOLUTION INTRAMUSCULAR; INTRAVENOUS; SUBCUTANEOUS
Status: DISCONTINUED | OUTPATIENT
Start: 2018-05-18 | End: 2018-05-18 | Stop reason: HOSPADM

## 2018-05-18 RX ORDER — ONDANSETRON 2 MG/ML
4 INJECTION INTRAMUSCULAR; INTRAVENOUS EVERY 30 MIN PRN
Status: DISCONTINUED | OUTPATIENT
Start: 2018-05-18 | End: 2018-05-18 | Stop reason: HOSPADM

## 2018-05-18 RX ORDER — PROPOFOL 10 MG/ML
INJECTION, EMULSION INTRAVENOUS PRN
Status: DISCONTINUED | OUTPATIENT
Start: 2018-05-18 | End: 2018-05-18

## 2018-05-18 RX ORDER — SODIUM CHLORIDE, SODIUM LACTATE, POTASSIUM CHLORIDE, CALCIUM CHLORIDE 600; 310; 30; 20 MG/100ML; MG/100ML; MG/100ML; MG/100ML
INJECTION, SOLUTION INTRAVENOUS CONTINUOUS
Status: DISCONTINUED | OUTPATIENT
Start: 2018-05-18 | End: 2018-05-18 | Stop reason: HOSPADM

## 2018-05-18 RX ORDER — ONDANSETRON 2 MG/ML
INJECTION INTRAMUSCULAR; INTRAVENOUS PRN
Status: DISCONTINUED | OUTPATIENT
Start: 2018-05-18 | End: 2018-05-18

## 2018-05-18 RX ORDER — OXYCODONE HYDROCHLORIDE 5 MG/1
5 TABLET ORAL
Status: COMPLETED | OUTPATIENT
Start: 2018-05-18 | End: 2018-05-18

## 2018-05-18 RX ORDER — FENTANYL CITRATE 50 UG/ML
25-50 INJECTION, SOLUTION INTRAMUSCULAR; INTRAVENOUS
Status: DISCONTINUED | OUTPATIENT
Start: 2018-05-18 | End: 2018-05-18 | Stop reason: HOSPADM

## 2018-05-18 RX ORDER — DEXAMETHASONE SODIUM PHOSPHATE 4 MG/ML
INJECTION, SOLUTION INTRA-ARTICULAR; INTRALESIONAL; INTRAMUSCULAR; INTRAVENOUS; SOFT TISSUE PRN
Status: DISCONTINUED | OUTPATIENT
Start: 2018-05-18 | End: 2018-05-18

## 2018-05-18 RX ORDER — KETOROLAC TROMETHAMINE 30 MG/ML
INJECTION, SOLUTION INTRAMUSCULAR; INTRAVENOUS PRN
Status: DISCONTINUED | OUTPATIENT
Start: 2018-05-18 | End: 2018-05-18

## 2018-05-18 RX ORDER — PHENAZOPYRIDINE HYDROCHLORIDE 200 MG/1
200 TABLET, FILM COATED ORAL ONCE
Status: COMPLETED | OUTPATIENT
Start: 2018-05-18 | End: 2018-05-18

## 2018-05-18 RX ORDER — IBUPROFEN 600 MG/1
600 TABLET, FILM COATED ORAL EVERY 6 HOURS PRN
Qty: 30 TABLET | Refills: 0 | Status: SHIPPED | OUTPATIENT
Start: 2018-05-18 | End: 2019-04-25

## 2018-05-18 RX ORDER — HYDROMORPHONE HYDROCHLORIDE 1 MG/ML
.3-.5 INJECTION, SOLUTION INTRAMUSCULAR; INTRAVENOUS; SUBCUTANEOUS EVERY 10 MIN PRN
Status: DISCONTINUED | OUTPATIENT
Start: 2018-05-18 | End: 2018-05-18 | Stop reason: HOSPADM

## 2018-05-18 RX ORDER — FENTANYL CITRATE 50 UG/ML
INJECTION, SOLUTION INTRAMUSCULAR; INTRAVENOUS PRN
Status: DISCONTINUED | OUTPATIENT
Start: 2018-05-18 | End: 2018-05-18

## 2018-05-18 RX ORDER — ACETAMINOPHEN 325 MG/1
650 TABLET ORAL
Status: DISCONTINUED | OUTPATIENT
Start: 2018-05-18 | End: 2018-05-18 | Stop reason: HOSPADM

## 2018-05-18 RX ORDER — LIDOCAINE 40 MG/G
CREAM TOPICAL
Status: DISCONTINUED | OUTPATIENT
Start: 2018-05-18 | End: 2018-05-18 | Stop reason: HOSPADM

## 2018-05-18 RX ORDER — ACETAMINOPHEN 325 MG/1
650 TABLET ORAL EVERY 4 HOURS PRN
Qty: 100 TABLET | Refills: 0 | Status: SHIPPED | OUTPATIENT
Start: 2018-05-18 | End: 2019-04-25

## 2018-05-18 RX ORDER — SODIUM CHLORIDE, SODIUM LACTATE, POTASSIUM CHLORIDE, CALCIUM CHLORIDE 600; 310; 30; 20 MG/100ML; MG/100ML; MG/100ML; MG/100ML
INJECTION, SOLUTION INTRAVENOUS CONTINUOUS PRN
Status: DISCONTINUED | OUTPATIENT
Start: 2018-05-18 | End: 2018-05-18

## 2018-05-18 RX ORDER — MEPERIDINE HYDROCHLORIDE 25 MG/ML
12.5 INJECTION INTRAMUSCULAR; INTRAVENOUS; SUBCUTANEOUS
Status: DISCONTINUED | OUTPATIENT
Start: 2018-05-18 | End: 2018-05-18 | Stop reason: HOSPADM

## 2018-05-18 RX ADMIN — DEXAMETHASONE SODIUM PHOSPHATE 6 MG: 4 INJECTION, SOLUTION INTRAMUSCULAR; INTRAVENOUS at 07:52

## 2018-05-18 RX ADMIN — BUPIVACAINE HYDROCHLORIDE 20 ML: 2.5 INJECTION, SOLUTION EPIDURAL; INFILTRATION; INTRACAUDAL at 07:30

## 2018-05-18 RX ADMIN — MIDAZOLAM HYDROCHLORIDE 2 MG: 1 INJECTION, SOLUTION INTRAMUSCULAR; INTRAVENOUS at 07:25

## 2018-05-18 RX ADMIN — KETOROLAC TROMETHAMINE 30 MG: 30 INJECTION, SOLUTION INTRAMUSCULAR at 09:24

## 2018-05-18 RX ADMIN — ACETAMINOPHEN 650 MG: 325 TABLET ORAL at 10:49

## 2018-05-18 RX ADMIN — ONDANSETRON 4 MG: 2 INJECTION INTRAMUSCULAR; INTRAVENOUS at 09:24

## 2018-05-18 RX ADMIN — OXYCODONE HYDROCHLORIDE 5 MG: 5 TABLET ORAL at 10:49

## 2018-05-18 RX ADMIN — FENTANYL CITRATE 50 MCG: 50 INJECTION, SOLUTION INTRAMUSCULAR; INTRAVENOUS at 08:32

## 2018-05-18 RX ADMIN — ROCURONIUM BROMIDE 50 MG: 10 INJECTION INTRAVENOUS at 07:44

## 2018-05-18 RX ADMIN — SODIUM CHLORIDE, POTASSIUM CHLORIDE, SODIUM LACTATE AND CALCIUM CHLORIDE: 600; 310; 30; 20 INJECTION, SOLUTION INTRAVENOUS at 07:37

## 2018-05-18 RX ADMIN — SUGAMMADEX 150 MG: 100 INJECTION, SOLUTION INTRAVENOUS at 09:51

## 2018-05-18 RX ADMIN — SODIUM CHLORIDE, POTASSIUM CHLORIDE, SODIUM LACTATE AND CALCIUM CHLORIDE: 600; 310; 30; 20 INJECTION, SOLUTION INTRAVENOUS at 09:27

## 2018-05-18 RX ADMIN — ROCURONIUM BROMIDE 20 MG: 10 INJECTION INTRAVENOUS at 08:56

## 2018-05-18 RX ADMIN — CEFAZOLIN 2 G: 1 INJECTION, POWDER, FOR SOLUTION INTRAMUSCULAR; INTRAVENOUS at 08:02

## 2018-05-18 RX ADMIN — PROPOFOL 50 MG: 10 INJECTION, EMULSION INTRAVENOUS at 07:46

## 2018-05-18 RX ADMIN — BUPIVACAINE 20 ML: 13.3 INJECTION, SUSPENSION, LIPOSOMAL INFILTRATION at 07:30

## 2018-05-18 RX ADMIN — FENTANYL CITRATE 50 MCG: 50 INJECTION, SOLUTION INTRAMUSCULAR; INTRAVENOUS at 07:45

## 2018-05-18 RX ADMIN — PROPOFOL 150 MG: 10 INJECTION, EMULSION INTRAVENOUS at 07:45

## 2018-05-18 RX ADMIN — PHENAZOPYRIDINE HYDROCHLORIDE 200 MG: 200 TABLET, COATED ORAL at 06:30

## 2018-05-18 NOTE — OP NOTE
Gynecologic Operative Report    Kasandra Dasilva  [id number]  5/18/2018     Pre-operative Diagnosis:  Bilateral Ovarian Dermoid Cysts    Post-operative Diagnosis:    Bilateral Ovarian Dermoid Cysts      Left Ovarian Torsion          Surgeon:  Shelby uHnt MD    Assistants:  Alicia Myhre,  PGY-1         Melissa Wilkerson MD, PGY-4         Rebecca Christianson MS4    Anesthesia:  GETA    Procedure:  Mini laparotomy, Left oophorectomy, bilateral salpingectomy, right ovarian cystectomy, pelvic exam and pap smear       EBL:  20 cc    IVF:  1150 cc crystalloid    UOP:  240 cc clear yellow urine    Specimens:  Left ovary and cyst, bilateral fallopian tubes, right ovarian cyst     Complications:  None apparent    Indications:  Kasandra Dasilva is a 47 year old  presented to the ED for abdominal pain and was found to have bilateral ovarian cysts on CT. AFP and Inhibin A tumor markers are WNL. Surgical management was recommended.  Following a thorough discussion of the risks, benefits, indications and alternatives she consented to the above procedure.     Findings:  Cervix normal appearing. Pap smear collected. On entry into abdomen, no evidence of injury and normal appearing appendix and uterus. Evidence of left ovarian torsion with left ovary fully encompassed with ~10cm dermoid cyst with smooth walls and septations with hair and sebaceous fluid visible within the cyst. The right ovary had ~7cm dermoid appearing cyst as well but was able to remove the dermoid cyst and leave normal appearing ovary. Fibrillar used within the ovarian cystectomy site for hemostasis and right ovary cystectomy site re-approximated. Bilateral normal appearance to the fallopian tubes.      Operative Procedure:  Patient was brought to the operating room where she underwent general anesthesia without difficulty.  The patient was placed in dorsal supine position. General anesthetic was obtained without noted difficulties. A surgical  timeout was called at which point the patient's name, operative procedure and site was confirmed by the operative team. A pelvic exam was performed, speculum placed, and pap smear collected. Delcid catheter was placed under sterile techniques. The patient was then prepped and draped for an abdominal procedure.  A pfannenstiel skin incision was made with the scalpel, and carried down to the underlying fascia with electrocautery. The fascia was incised in the midline, and the incision was extended laterally with the electrocautery. The superior aspect of the fascia was grasped with the Kocher clamps and dissected off of the underlying rectus muscles with blunt and sharp dissection. Attention was then turned to the inferior aspect of the fascia, which was similarly dissected off of the underlying rectus muscles. The rectus muscles were  in the midline, and the peritoneum was entered bluntly, and the opening was extended with digital pressure. The medium Deshawn-O retractor was placed without difficulty. Pelvic survey revealed normal sized uterus with bilateral ovarian cysts, the left measured approximately 10 cm, the right measuring approximately 7cm. The left fimbriae were grasped with babcocks and an avascular plane in the mesosalpinx was cauterized and cut using the LigaSure.The left fallopian tube was then amputated with LigaSure. The left infundibulopelvic ligament was cauterized and ligated with the LigaSure, amputating the ovary and cyst. The site was hemostatic. Attention was then turned to the right ovary. The right fallopian tube was grasped with  babcocks and an avascular plane in the mesosalpinx was cauterized and cut using the LigaSure. The right tube was amputated and the site hemostatic. Attention was turned to the right ovarian cyst.  Electrocautery was used to score the cyst. The cyst wall was then grasped with nitesh clamps and it was easily peeled away from the normal ovarian tissue. Remaining  ovarian tissue was hemostatic with cautery and Fibrillar. The ovarian tissue was reapproximated using 3-0 vicryl. The site was hemostatic. The fascia and rectus muscles were examined and areas of oozing were controlled with electrocautery. The fascia was closed with a running 0 Vicryl suture. The subcutaneous tissue was irrigated and areas of oozing were controlled with electrocautery. The subcutaneous tissue was greater than 2 cm in thickness, and was therefore closed. The skin was closed with 4-0 monocryl and covered with steri-strips and a sterile dressing.    Sponge, lap, instrument and needle counts were reported as correct x 2. Dr. Branch was scrubbed and present for the entire procedure. The patient was then repositioned appropriately, recalled from the general anesthetic and was transferred to recovery unit in stable condition.       Alicia Myhre,   Obstetrics and Gyncology, PGY-1  May 18, 2018 , 6:42 AM      I was present and scrubbed for the entire procedure until skin closure at which time I was immediately available.   Shelby Branch

## 2018-05-18 NOTE — ANESTHESIA PREPROCEDURE EVALUATION
Anesthesia Evaluation     . Pt has had prior anesthetic. Type: General           ROS/MED HX    ENT/Pulmonary:  - neg pulmonary ROS     Neurologic:  - neg neurologic ROS     Cardiovascular:     (+) hypertension----. : . . . :. .       METS/Exercise Tolerance:  4 - Raking leaves, gardening   Hematologic:     (+) Anemia (iron deficiency anemia ), -      Musculoskeletal:         GI/Hepatic: Comment: Diverticulosis         Renal/Genitourinary:  - ROS Renal section negative       Endo:  - neg endo ROS       Psychiatric:         Infectious Disease:         Malignancy:         Other:                     Physical Exam  Normal systems: cardiovascular and pulmonary    Airway   Mallampati: III  Neck ROM: full    Dental   (+) missing and upper dentures    Cardiovascular   Rhythm and rate: regular and normal      Pulmonary    breath sounds clear to auscultation                    Anesthesia Plan      History & Physical Review      ASA Status:  2 .    NPO Status:  > 8 hours    Plan for General and ETT with Intravenous induction. Maintenance will be Inhalation.    PONV prophylaxis:  Ondansetron (or other 5HT-3) and Dexamethasone or Solumedrol       Postoperative Care  Postoperative pain management:  IV analgesics and Oral pain medications.      Consents  Anesthetic plan, risks, benefits and alternatives discussed with:  Patient..                          .

## 2018-05-18 NOTE — LETTER
5/30/2018         Kasandra Dasilva   [address]  [facility] [address]        Dear Ms. Rakesh Dasilva:    Dr Branch is out of town, so I am getting your results to you.     Good news!  Your pathology report is all benign.    Shanti Austin MD, FACOG  [facility] Staff  OB/GYN    5/30/2018  2:52 PM      Results for orders placed or performed during the hospital encounter of 05/18/18   Glucose   Result Value Ref Range    Glucose 116 (H) 70 - 99 mg/dL   HCG quantitative pregnancy   Result Value Ref Range    HCG Quantitative Serum <1 0 - 5 IU/L   Hemoglobin   Result Value Ref Range    Hemoglobin 10.5 (L) 11.7 - 15.7 g/dL   HPV High Risk Types DNA Cervical   Result Value Ref Range    HPV Source SurePath     HPV 16 DNA Negative NEG^Negative    HPV 18 DNA Negative NEG^Negative    Other HR HPV Negative NEG^Negative    Final Diagnosis This patient's sample is negative for HPV DNA.     Specimen Description Cervical Cells    PAP imaged thin layer, diagnostic   Result Value Ref Range    PAP NIL     Copath Report         Patient Name: KASANDRA ROYAL  MR#: [id number]  Specimen #: [id number]  Collected: 5/18/2018  Received: 5/21/2018  Reported: 5/23/2018 15:20  Ordering Phy(s): GIOVANNA BRANCH    For improved result formatting, select 'View Enhanced Report Format' under   Linked Documents section.    SPECIMEN/STAIN PROCESS:  Pap Imaged thin layer prep diagnostic (SurePath, FocalPoint with guided   screening)       Pap-Cyto x 1, HPV ordered x 1    SOURCE: Cervical  ----------------------------------------------------------------   Pap Imaged thin layer prep diagnostic (SurePath, FocalPoint with guided   screening)  SPECIMEN ADEQUACY:  Satisfactory for evaluation.  -Transformation zone component present.    CYTOLOGIC INTERPRETATION:    Negative for intraepithelial lesion or malignancy    Electronically signed out by:  LEATHA Bowles ([facility])    Processed and screened at  "[facility]    CLINICAL HISTORY:    Exam Note:: Pelvic pain,     Papanicolaou Test Limitations:  Cervical cytology is a screening test with   limited sensitivity; regular  screening is critical for cancer prevention; Pap tests are primarily   effective for the diagnosis/prevention of  squamous cell carcinoma, not adenocarcinomas or other cancers.    TESTING LAB LOCATION:  [facility], [address]  [address] [phone]    COLLECTION SITE:  Client:  [facility]  Location: [facility] (B)       Surgical pathology exam   Result Value Ref Range    Copath Report       Patient Name: POLI ROYAL  MR#: [id number]  Specimen #: [id number]  Collected: 5/18/2018  Received: 5/18/2018  Reported: 5/23/2018 13:11  Ordering Phy(s): GIOVANNA ODONNELL    For improved result formatting, select 'View Enhanced Report Format' under   Linked Documents section.    SPECIMEN(S):  A: Ovary, left  B: Fallopian tube, left  C: Ovary, right  D: Fallopian tube, right    FINAL DIAGNOSIS:  A. OVARY, LEFT, OOPHORECTOMY:  - Mature cystic teratoma    B. FALLOPIAN TUBE, LEFT, SALPINGECTOMY:  - Fallopian tube with no significant histologic abnormality    C. OVARY, RIGHT, OOPHORECTOMY:  - Mature cystic teratoma    D. FALLOPIAN TUBE, RIGHT, SALPINGECTOMY:  - Fallopian tube with no significant histologic abnormality    I have personally reviewed all specimens and/or slides, including the   listed special stains, and used them  with my medical judgement to determine or confirm the final diagnosis.    Electronically signed out by:    Justin James M.D., PhD, [facility]     CLINICAL HISTORY:  Pelvic pain    GROSS:  A: The specimen is received in formalin with proper patient   identification, labeled \"left ovary\".  The  specimen consists of a 283.0 g intact ovary.  The " "specimen measures 11.5 x   8.0 x 6.2 cm.  The external surface  is tan, smooth and dull with two tan-yellow ear post tissue nodules   measuring 4.0 cm in greatest dimension.  The specimen is multi-sected to reveal yellow-tan keratinous material   within the center and smooth inner  lining.  No masses, lesions or other gross abnormalities are identified.    Photo taken.  Representative  sections are submitted as follows:    Summary of Sections:  A1-A4 - random sections of cystic wall and keratinous material  A5 - potential ovary    B: The specimen is received in formalin with the proper patient   identification, labeled \"left fallopian tube\".   The specimen consists of a tubular segment of purple-tan soft tissue with   fimbriated ends consistent with a  fallopian tube measuring 3.2 cm in le ngth and 0.9 cm in diameter.   The   specimen is serially sectioned to  reveal a grossly complete cross section of fallopian tube with a pin-point   lumen.  Representative sections are  submitted in cassette B1.    C: The specimen is received in formalin with proper patient   identification, labeled \"right ovarian dermoid\".  The specimen consists of two tan-gray membranous tissues and one intact   cystic structure.  The entire specimen  weighs 162.3 g.  The first membranous tissue measures 6.0 x 2.0 x 0.2 cm   and the second membranous tissue  measures 6.2 x 2.0 x 0.2 cm.  The cystic structure measures 8.0 x 7.0 x   0.5 cm.  The specimen is multi-sected  to reveal a tan yellow keratinous material in the center and smooth inner   lining.  No masses, lesions or other  gross abnormalities are identified.  Photo taken.  Representative sections   are stated as follows:    Summary of Sections:  C1 - portions of smallest membranous tissue  C2 - portions of largest membranous tissue  C3-C5 - p ortions of cystic structure wall    D: The specimen is received in formalin with the proper patient   identification, labeled \"right " "fallopian  tube\".  The specimen consists of a tubular segment of purple-tan soft   tissue with fimbriated ends consistent  with a fallopian tube measuring 7.5 cm in length and 0.7 cm in diameter.     The specimen is thoroughly  sectioned to reveal a grossly complete cross section of fallopian tube   with a pin-point lumen.  Representative  sections are submitted in cassette D1. (Dictated by: Estefany Gil   5/21/2018 09:22 AM)    MICROSCOPIC:  Microscopic examination was performed.    CPT Codes:  A: 86533-VQ0  B: 90493-RH1  C: 06045-XU4  D: 66913-PL4    TESTING LAB LOCATION:  [facility], [address] [place] [address] [phone]    COLLECTION SITE:  Client: [facility]  Location: [facility] (B)       ABO/Rh type and screen   Result Value Ref Range    ABO O     RH(D) Pos     Antibody Screen Neg     Test Valid Only At          [facility]    Specimen Expires 05/21/2018          Please note that test explanations are brief and do not reflect all diagnostic uses.  If you have any questions or concerns, please call the clinic at [phone].      Sincerely,      Shanti Austin MD  "

## 2018-05-18 NOTE — PROGRESS NOTES
OBGYN Progress Note    Spoke with Kasandra in PACU with . Informed her that her surgery went well. She is sleepy but doing overall well. Tolerating small amounts of food and drink. Has not yet ambulated. Would like to go home today after spending some more time in the PACU. Discharge scripts written and patient will follow up with Dr. Branch in 2 weeks for post-op visit.    MD MATT Anguiano, PGY-4  5/18/2018 1:38 PM

## 2018-05-18 NOTE — IP AVS SNAPSHOT
MRN:[id number]                      After Visit Summary   5/18/2018    Kasandra Dasilva    MRN: [id number]           Thank you!     Thank you for choosing [facility] for your care. Our goal is always to provide you with excellent care. Hearing back from our patients is one way we can continue to improve our services. Please take a few minutes to complete the written survey that you may receive in the mail after you visit with us. Thank you!        Patient Information     Date Of Birth          1970        About your hospital stay     You were admitted on:  May 18, 2018 You last received care in the:  [facility] PACU    You were discharged on:  May 18, 2018       Who to Call     For medical emergencies, please call 911.  For non-urgent questions about your medical care, please call your primary care provider or clinic, [phone]  For questions related to your surgery, please call your surgery clinic        Attending Provider     Provider Shanti Hurley MD OB/Gyn       Primary Care Provider Office Phone # Fax #    Shelby Branch [phone]      After Care Instructions     Discharge Instructions       Patient may drive beginning 2 weeks after surgery. No driving while taking narcotics. Must be able to comfortably slam on brakes.            Discharge Instructions       Pelvic Rest. No tampons, douching or intercourse for  2  weeks.            Discharge Instructions       Please keep scheduled follow-up appointments            Ice to affected area       PRN as tolerated            No lifting       No lifting over 20 pounds and no strenuous physical activity.  For 6 weeks            Shower        Shower on Post-op day  1.                  Your next 10 appointments already scheduled     Jun 01, 2018 10:45 AM CDT   Return Visit with Shanti Austin MD   [facility] ([facility])    [facility] [address]  [address]  "[address]  [address]  [facility] [phone]              Further instructions from your care team       Discharge Instructions:   Following a Laparoscopy    Comfort:    The amount of discomfort you can expect is very unpredictable.     If you have pain that cannot be controlled with non aspirin medication or with the prescription medication you may have received, you should notify your physician.     You May Experience:    Abdominal tenderness; abdominal cramps (like menstrual cramps).    Low back ache or discomfort radiating to your shoulders, chest, back or neck. This is a result of the gas used to inflate your abdomen during surgery. This gas is absorbed in 24 to 36 hours. The \"knee chest\" position will help relieve this discomfort.    Sore throat for a day or two resulting from the anesthesia tube used during surgery. You may use throat lozenges to help relieve this discomfort.    Black and blue marks on your abdomen.    Drainage:    You may expect a small amount of drainage from the incision on your abdomen and you may change the bandage when necessary.    You may also have a small amount of vaginal drainage for 3 to 4 days; this is normal and no cause for concern. If excessive bleeding occurs, notify your physician.    Do not douche, and use a pad rather than tampons. Do not resume intercourse for at least one week or until bleeding has ceased.    Home Activity:    The day of surgery spend a quiet day at home.    Increase activity as tolerated.    You may bathe or shower, do not soak in bath tub or scrub incisions.    You have no restrictions on your diet. Following surgery, drink plenty of fluids and eat a light meal.    The anesthesia may produce some nausea. If you feel nauseated, stay in bed, keep your head down and try drinking fluids such as Seven-Up, tea or soup.    Notify Physician at Once IF:    You have a fever over 100 degrees. A low grade fever (under 100 degrees) is usual " after surgery.    You have severe pain.    You have a large amount of bleeding or drainage.    Rev. 2014    Same-Day Surgery   Adult Discharge Orders & Instructions     For 24 hours after surgery:  1. Get plenty of rest.  A responsible adult must stay with you for at least 24 hours after you leave the hospital.   2. Pain medication can slow your reflexes. Do not drive or use heavy equipment.  If you have weakness or tingling, don't drive or use heavy equipment until this feeling goes away.  3. Mixing alcohol and pain medication can cause dizziness and slow your breathing. It can even be fatal. Do not drink alcohol while taking pain medication.  4. Avoid strenuous or risky activities.  Ask for help when climbing stairs.   5. You may feel lightheaded.  If so, sit for a few minutes before standing.  Have someone help you get up.   6. If you have nausea (feel sick to your stomach), drink only clear liquids such as apple juice, ginger ale, broth or 7-Up.  Rest may also help.  Be sure to drink enough fluids.  Move to a regular diet as you feel able. Take pain medications with a small amount of solid food, such as toast or crackers, to avoid nausea.   7. A slight fever is normal. Call the doctor if your fever is over 100 F (37.7 C) (taken under the tongue) or lasts longer than 24 hours.  8. You may have a dry mouth, muscle aches, trouble sleeping or a sore throat.  These symptoms should go away after 24 hours.  9. Do not make important or legal decisions.   Pain Management:      1. Take pain medication (if prescribed) for pain as directed by your physician.        2. WARNING: If the pain medication you have been prescribed contains Tylenol  (acetaminophen), DO NOT take additional doses of Tylenol (acetaminophen).     Call your doctor for any of the followin.  Signs of infection (fever, growing tenderness at the surgery site, severe pain, a large amount of drainage or bleeding, foul-smelling drainage, redness,  "swelling).    2.  It has been over 8 to 10 hours since surgery and you are still not able to urinate (pee).    3.  Headache for over 24 hours.    4.  Numbness, tingling or weakness the day after surgery (if you had spinal anesthesia).  To contact a doctor, call Dr. Branch's office or:      [phone] and ask for the Resident On Call for:          OB/GYN (answered 24 hours a day)      Emergency Department:  [place] [facility]: [phone]  [place] [facility]: [phone]               Rev. 10/2014       Additional Information     If you use hormonal birth control (such as the pill, patch, ring or implants): You'll need a second form of birth control for 7 days (condoms, a diaphragm or contraceptive foam). While in the hospital, you received a medicine called Bridion. Your normal birth control will not work as well for a week after taking this medicine.          Pending Results     Date and Time Order Name Status Description    5/18/2018 0844 Surgical pathology exam In process     5/18/2018 0757 PAP imaged thin layer, diagnostic In process     5/18/2018 0757 HPV High Risk Types DNA Cervical In process             Admission Information     Date & Time Provider Department Dept. Phone    5/18/2018 Shanti Austin MD [facility] PACU [phone]      Your Vitals Were     Blood Pressure Temperature Respirations Height Weight Last Period    131/76 99  F (37.2  C) (Oral) 14 1.524 m (5') 74.6 kg (164 lb 7.4 oz) 04/30/2018    Pulse Oximetry BMI (Body Mass Index)                98% 32.12 kg/m2          [employer] Information     [employer] lets you send messages to your doctor, view your test results, renew your prescriptions, schedule appointments and more. To sign up, go to [email or web address] . Click on \"Log in\" on the left side of the screen, which will take you to the Welcome page. Then click on \"Sign up Now\" on the right side of the page.     You will be asked to enter the access code listed " below, as well as some personal information. Please follow the directions to create your username and password.     Your access code is: [email or web address]  Expires: 2018 10:11 AM     Your access code will  in 90 days. If you need help or a new code, please call your [facility] clinic or [phone].        Care EveryWhere ID     This is your Care EveryWhere ID. This could be used by other organizations to access your [facility] medical records  [id number]        Equal Access to Services     DELANO DUNBAR : Hadankit solo hadproo Sokristen, waaxda luqadaha, qaybta kaalmada yony, candis david . So [facility] [phone].    ATENCIÓN: Si habla español, tiene a whitaker disposición servicios gratuitos de asistencia lingüística. Amy al [phone].    We comply with applicable federal civil rights laws and [place] laws. We do not discriminate on the basis of race, color, national origin, age, disability, sex, sexual orientation, or gender identity.               Review of your medicines      START taking        Dose / Directions    ibuprofen 600 MG tablet   Commonly known as:  ADVIL/MOTRIN   Used for:  S/P laparotomy, S/P ovarian cystectomy, S/P left oophorectomy, H/O bilateral salpingectomy        Dose:  600 mg   Take 1 tablet (600 mg) by mouth every 6 hours as needed for pain (mild)   Quantity:  30 tablet   Refills:  0       oxyCODONE IR 5 MG tablet   Commonly known as:  ROXICODONE   Used for:  S/P laparotomy, S/P ovarian cystectomy, S/P left oophorectomy, H/O bilateral salpingectomy        Dose:  5-10 mg   Take 1-2 tablets (5-10 mg) by mouth every 6 hours as needed for pain or moderate to severe pain (Moderate to Severe)   Quantity:  20 tablet   Refills:  0         CONTINUE these medicines which may have CHANGED, or have new prescriptions. If we are uncertain of the size of tablets/capsules you have at home, strength may be listed as something that might have changed.        Dose / Directions  "   * TYLENOL PO   This may have changed:  Another medication with the same name was added. Make sure you understand how and when to take each.        Refills:  0       * acetaminophen 325 MG tablet   Commonly known as:  TYLENOL   This may have changed:  You were already taking a medication with the same name, and this prescription was added. Make sure you understand how and when to take each.   Used for:  S/P laparotomy, S/P ovarian cystectomy, S/P left oophorectomy, H/O bilateral salpingectomy        Dose:  650 mg   Take 2 tablets (650 mg) by mouth every 4 hours as needed for other (mild pain)   Quantity:  100 tablet   Refills:  0       * Notice:  This list has 2 medication(s) that are the same as other medications prescribed for you. Read the directions carefully, and ask your doctor or other care provider to review them with you.         Where to get your medicines      These medications were sent to [facility] [place], [place] - [address]  [address] [place] [address], [facility] [address]     Phone:  [phone]     acetaminophen 325 MG tablet    ibuprofen 600 MG tablet         Some of these will need a paper prescription and others can be bought over the counter. Ask your nurse if you have questions.     Bring a paper prescription for each of these medications     oxyCODONE IR 5 MG tablet                Protect others around you: Learn how to safely use, store and throw away your medicines at www.disposemymeds.org.        Information about your nerve block     Today you received a block to numb the nerves near your surgery site.    This is a block using local anesthetic or \"numbing\" medication injected around the nerves to anesthetize or \"numb\" the area supplied by those nerves. This block is injected into the muscle layer near your surgical site. The type of anesthesia (Exparel) your anesthesia team used to numb your abdomen may give you relief for up to 72 hours.     Diet: There are no diet " restrictions, but you should drink plenty of fluids, unless you are on a fluid-restricted diet.     Activity: If your surgical site is an arm or leg you should be careful with your affected limb, since it is possible to injure your limb without being aware of it due to the numbing. Until full feeling returns, you should guard against bumping or hitting your limb, and avoid extreme hot or cold temperatures on the skin.    Pain Medication: As the block wears off, the feeling will return as a tingling or prickly sensation near your surgical site. You will experience more discomfort from your incisions as the feeling returns. You may want to take a pain pill (a narcotic or Tylenol if this was prescribed by your surgeon) when you start to experience mild pain, before the pain becomes more severe. If your pain medications do not control your pain, you should notify your surgeon. If you are taking narcotics for pain management, do not drink alcohol, drive a car, or perform hazardous activities.  If you have questions or concerns you may call your surgeon at the number provided with your discharge instructions.     Call your surgeon if you experience blurry vision, ringing in the ears or metallic taste in your mouth.         Information about OPIOIDS     PRESCRIPTION OPIOIDS: WHAT YOU NEED TO KNOW   You have a prescription for an opioid (narcotic) pain medicine. Opioids can cause addiction. If you have a history of chemical dependency of any type, you are at a higher risk of becoming addicted to opioids. Only take this medicine after all other options have been tried. Take it for as short a time and as few doses as possible.     Do not:    Drive. If you drive while taking these medicines, you could be arrested for driving under the influence (DUI).    Operate heavy machinery    Do any other dangerous activities while taking these medicines.     Drink any alcohol while taking these medicines.      Take with any other  medicines that contain acetaminophen. Read all labels carefully. Look for the word  acetaminophen  or  Tylenol.  Ask your pharmacist if you have questions or are unsure.    Store your pills in a secure place, locked if possible. We will not replace any lost or stolen medicine. If you don t finish your medicine, please throw away (dispose) as directed by your pharmacist. The [place] Pollution Control Agency has more information about safe disposal: https://www.pca.[facility].[place].us/living-green/managing-unwanted-medications    All opioids tend to cause constipation. Drink plenty of water and eat foods that have a lot of fiber, such as fruits, vegetables, prune juice, apple juice and high-fiber cereal. Take a laxative (Miralax, milk of magnesia, Colace, Senna) if you don t move your bowels at least every other day.              Medication List: This is a list of all your medications and when to take them. Check marks below indicate your daily home schedule. Keep this list as a reference.      Medications           Morning Afternoon Evening Bedtime As Needed    ibuprofen 600 MG tablet   Commonly known as:  ADVIL/MOTRIN   Take 1 tablet (600 mg) by mouth every 6 hours as needed for pain (mild)                                oxyCODONE IR 5 MG tablet   Commonly known as:  ROXICODONE   Take 1-2 tablets (5-10 mg) by mouth every 6 hours as needed for pain or moderate to severe pain (Moderate to Severe)   Last time this was given:  5 mg on 5/18/2018 10:49 AM   Last time this was given:  1100   Next Dose Due:  5pm                                * TYLENOL PO   Last time this was given:  650 mg on 5/18/2018 10:49 AM   Last time this was given:  1100                                * acetaminophen 325 MG tablet   Commonly known as:  TYLENOL   Take 2 tablets (650 mg) by mouth every 4 hours as needed for other (mild pain)   Last time this was given:  650 mg on 5/18/2018 10:49 AM   Last time this was given:  1100   Next Dose Due:  3PM                                 * Notice:  This list has 2 medication(s) that are the same as other medications prescribed for you. Read the directions carefully, and ask your doctor or other care provider to review them with you.

## 2018-05-18 NOTE — ANESTHESIA POSTPROCEDURE EVALUATION
Patient: Kasandra Dasilva    Procedure(s):  Mini laparotomy, Right Cystectomy, Bilateral Salpingectomy, Left Oophorectomy, pelvic exam and pap smear  - Wound Class: II-Clean Contaminated   - Wound Class: II-Clean Contaminated   - Wound Class: II-Clean Contaminated  pap smear and pelvic examn   - Wound Class: II-Clean Contaminated   - Wound Class: II-Clean Contaminated    Diagnosis:Pelvic Pain   Diagnosis Additional Information: No value filed.    Anesthesia Type:  General, ETT    Note:  Anesthesia Post Evaluation    Patient location during evaluation: PACU  Patient participation: Able to fully participate in evaluation  Level of consciousness: awake and alert  Pain management: adequate  Airway patency: patent  Cardiovascular status: acceptable  Respiratory status: acceptable  Hydration status: acceptable  PONV: none             Last vitals:  Vitals:    05/18/18 1030 05/18/18 1045 05/18/18 1100   BP: 128/64 125/71 131/76   Resp: 19 15 14   Temp: 37.2  C (99  F)     SpO2: 97% 98% 98%         Electronically Signed By: Deana Guerrero MD  May 18, 2018  11:03 AM

## 2018-05-18 NOTE — DISCHARGE INSTRUCTIONS
"Discharge Instructions:   Following a Laparoscopy    Comfort:    The amount of discomfort you can expect is very unpredictable.     If you have pain that cannot be controlled with non aspirin medication or with the prescription medication you may have received, you should notify your physician.     You May Experience:    Abdominal tenderness; abdominal cramps (like menstrual cramps).    Low back ache or discomfort radiating to your shoulders, chest, back or neck. This is a result of the gas used to inflate your abdomen during surgery. This gas is absorbed in 24 to 36 hours. The \"knee chest\" position will help relieve this discomfort.    Sore throat for a day or two resulting from the anesthesia tube used during surgery. You may use throat lozenges to help relieve this discomfort.    Black and blue marks on your abdomen.    Drainage:    You may expect a small amount of drainage from the incision on your abdomen and you may change the bandage when necessary.    You may also have a small amount of vaginal drainage for 3 to 4 days; this is normal and no cause for concern. If excessive bleeding occurs, notify your physician.    Do not douche, and use a pad rather than tampons. Do not resume intercourse for at least one week or until bleeding has ceased.    Home Activity:    The day of surgery spend a quiet day at home.    Increase activity as tolerated.    You may bathe or shower, do not soak in bath tub or scrub incisions.    You have no restrictions on your diet. Following surgery, drink plenty of fluids and eat a light meal.    The anesthesia may produce some nausea. If you feel nauseated, stay in bed, keep your head down and try drinking fluids such as Seven-Up, tea or soup.    Notify Physician at Once IF:    You have a fever over 100 degrees. A low grade fever (under 100 degrees) is usual after surgery.    You have severe pain.    You have a large amount of bleeding or drainage.    Rev. 4/2014    Same-Day Surgery "   Adult Discharge Orders & Instructions     For 24 hours after surgery:  1. Get plenty of rest.  A responsible adult must stay with you for at least 24 hours after you leave the hospital.   2. Pain medication can slow your reflexes. Do not drive or use heavy equipment.  If you have weakness or tingling, don't drive or use heavy equipment until this feeling goes away.  3. Mixing alcohol and pain medication can cause dizziness and slow your breathing. It can even be fatal. Do not drink alcohol while taking pain medication.  4. Avoid strenuous or risky activities.  Ask for help when climbing stairs.   5. You may feel lightheaded.  If so, sit for a few minutes before standing.  Have someone help you get up.   6. If you have nausea (feel sick to your stomach), drink only clear liquids such as apple juice, ginger ale, broth or 7-Up.  Rest may also help.  Be sure to drink enough fluids.  Move to a regular diet as you feel able. Take pain medications with a small amount of solid food, such as toast or crackers, to avoid nausea.   7. A slight fever is normal. Call the doctor if your fever is over 100 F (37.7 C) (taken under the tongue) or lasts longer than 24 hours.  8. You may have a dry mouth, muscle aches, trouble sleeping or a sore throat.  These symptoms should go away after 24 hours.  9. Do not make important or legal decisions.   Pain Management:      1. Take pain medication (if prescribed) for pain as directed by your physician.        2. WARNING: If the pain medication you have been prescribed contains Tylenol  (acetaminophen), DO NOT take additional doses of Tylenol (acetaminophen).     Call your doctor for any of the followin.  Signs of infection (fever, growing tenderness at the surgery site, severe pain, a large amount of drainage or bleeding, foul-smelling drainage, redness, swelling).    2.  It has been over 8 to 10 hours since surgery and you are still not able to urinate (pee).    3.  Headache for over  24 hours.    4.  Numbness, tingling or weakness the day after surgery (if you had spinal anesthesia).  To contact a doctor, call Dr. Branch's office or:      [phone] and ask for the Resident On Call for:          OB/GYN (answered 24 hours a day)      Emergency Department:  [place] [facility]: [phone]  [place] [facility]: [phone]               Rev. 10/2014

## 2018-05-18 NOTE — ANESTHESIA CARE TRANSFER NOTE
Patient: Kasandra Dasilva    Procedure(s):  Mini laparotomy, Right Cystectomy, Bilateral Salpingectomy, Left Oophorectomy, pelvic exam and pap smear  - Wound Class: II-Clean Contaminated   - Wound Class: II-Clean Contaminated   - Wound Class: II-Clean Contaminated  pap smear and pelvic examn   - Wound Class: II-Clean Contaminated   - Wound Class: II-Clean Contaminated    Diagnosis: Pelvic Pain   Diagnosis Additional Information: No value filed.    Anesthesia Type:   General, ETT     Note:  Airway :Face Mask  Patient transferred to:PACU  Comments: Spont respirations, oral suction done. + cough, + swallow. Extubated to FMO2, VS remain stable. Transported to PACU, report to RN. Handoff Report: Identifed the Patient, Identified the Reponsible Provider, Reviewed the pertinent medical history, Discussed the surgical course, Reviewed Intra-OP anesthesia mangement and issues during anesthesia, Set expectations for post-procedure period and Allowed opportunity for questions and acknowledgement of understanding      Vitals: (Last set prior to Anesthesia Care Transfer)    CRNA VITALS  5/18/2018 0929 - 5/18/2018 1009      5/18/2018             Pulse: 65    Ht Rate: 70    SpO2: 98 %                Electronically Signed By: CHARLINE Bansal CRNA  May 18, 2018  10:09 AM

## 2018-05-18 NOTE — ANESTHESIA PROCEDURE NOTES
Peripheral Nerve Block Procedure Note    Staff:     Anesthesiologist:  ROASLIE BUCKLEY  Location: Pre-op  Procedure Start/Stop TImes:      5/18/2018 7:21 AM    patient identified, IV checked, site marked, risks and benefits discussed, informed consent, monitors and equipment checked, pre-op evaluation, at physician/surgeon's request and post-op pain management      Correct Patient: Yes      Correct Position: Yes      Correct Site: Yes      Correct Procedure: Yes      Correct Laterality:  Yes    Site Marked:  Yes  Procedure details:     Procedure:  Quadratus lumborum    ASA:  2    Diagnosis:  Abdominal mass    Laterality:  Bilateral    Position:  Supine    Sterile Prep: chloraprep      Needle:  Short bevel    Needle gauge:  21    Needle length (inches):  4    Ultrasound: Yes      Ultrasound used to identify targeted nerve, plexus, or vascular structure and placed a needle adjacent to it      Permanent Image entered into patiient's record      Abnormal pain on injection: No      Blood Aspirated: No      Paresthesias:  No    Bleeding at site: No      Bolus via:  Needle    Infusion Method:  Single Shot    Complications:  None

## 2018-05-18 NOTE — IP AVS SNAPSHOT
[place] [address] [facility] [address]    Phone:  [phone]                                       After Visit Summary   5/18/2018    Kasandra Dasilva    MRN: [id number]           After Visit Summary Signature Page     I have received my discharge instructions, and my questions have been answered. I have discussed any challenges I see with this plan with the nurse or doctor.    ..........................................................................................................................................  Patient/Patient Representative Signature      ..........................................................................................................................................  Patient Representative Print Name and Relationship to Patient    ..................................................               ................................................  Date                                            Time    ..........................................................................................................................................  Reviewed by Signature/Title    ...................................................              ..............................................  Date                                                            Time

## 2018-05-23 LAB
COPATH REPORT: NORMAL
COPATH REPORT: NORMAL
PAP: NORMAL

## 2018-05-24 LAB
FINAL DIAGNOSIS: NORMAL
HPV HR 12 DNA CVX QL NAA+PROBE: NEGATIVE
HPV16 DNA SPEC QL NAA+PROBE: NEGATIVE
HPV18 DNA SPEC QL NAA+PROBE: NEGATIVE
SPECIMEN DESCRIPTION: NORMAL
SPECIMEN SOURCE CVX/VAG CYTO: NORMAL

## 2018-05-31 ENCOUNTER — TELEPHONE (OUTPATIENT)
Dept: OBGYN | Facility: CLINIC | Age: 48
End: 2018-05-31

## 2018-05-31 NOTE — TELEPHONE ENCOUNTER
----- Message from Eileen Gilliam sent at 5/30/2018 10:17 AM CDT -----  Regarding: paperwork for leave of absence from work  Contact: [phone]  Pt's  ed requesting call back to discuss pt's paperwork for leave of absence. Ed stated that the pt's work has still not received the paperwork signed by Dr. Branch. Ed can be reached at [phone]. Thanks.      Call Center    Please DO NOT send this message and/or reply back to sender.  Call Center Representatives DO NOT respond to messages.

## 2018-05-31 NOTE — TELEPHONE ENCOUNTER
Spoke with Tommy through . Told him that we did receive the paperwork and it will be faxed today. Apologized for the delay. Monik states she will have another provider sign in clinic today.

## 2018-06-01 ENCOUNTER — OFFICE VISIT (OUTPATIENT)
Dept: OBGYN | Facility: CLINIC | Age: 48
End: 2018-06-01
Attending: OBSTETRICS & GYNECOLOGY
Payer: COMMERCIAL

## 2018-06-01 VITALS
HEIGHT: 60 IN | WEIGHT: 161.6 LBS | HEART RATE: 67 BPM | DIASTOLIC BLOOD PRESSURE: 87 MMHG | BODY MASS INDEX: 31.73 KG/M2 | SYSTOLIC BLOOD PRESSURE: 133 MMHG

## 2018-06-01 DIAGNOSIS — D27.0 BILATERAL DERMOID CYSTS OF OVARIES: Primary | ICD-10-CM

## 2018-06-01 DIAGNOSIS — D27.1 BILATERAL DERMOID CYSTS OF OVARIES: Primary | ICD-10-CM

## 2018-06-01 PROCEDURE — T1013 SIGN LANG/ORAL INTERPRETER: HCPCS | Mod: U3,ZF

## 2018-06-01 PROCEDURE — G0463 HOSPITAL OUTPT CLINIC VISIT: HCPCS | Mod: ZF

## 2018-06-01 ASSESSMENT — PAIN SCALES - GENERAL: PAINLEVEL: NO PAIN (0)

## 2018-06-01 NOTE — MR AVS SNAPSHOT
After Visit Summary   2018    Kasandra Dasilva    MRN: [id number]           Patient Information     Date Of Birth          1970        Visit Information        Provider Department      2018 10:30 AM Perez Enrique Suzanne Marie, MD [facility]        Today's Diagnoses     Bilateral dermoid cysts of ovaries    -  1       Follow-ups after your visit        Who to contact     Please call your clinic at [phone] to:    Ask questions about your health    Make or cancel appointments    Discuss your medicines    Learn about your test results    Speak to your doctor            Additional Information About Your Visit        MyChart Information     [employer]ruantToday is an electronic gateway that provides easy, online access to your medical records. With Tru[employer]Today, you can request a clinic appointment, read your test results, renew a prescription or communicate with your care team.     To sign up for [employer] visit the website at [email or web address]   You will be asked to enter the access code listed below, as well as some personal information. Please follow the directions to create your username and password.     Your access code is: [email or web address]  Expires: 2018 10:11 AM     Your access code will  in 90 days. If you need help or a new code, please contact your [facility] or call [phone] for assistance.        Care EveryWhere ID     This is your Care EveryWhere ID. This could be used by other organizations to access your [facility] medical records  [id number]        Your Vitals Were     Pulse Height BMI (Body Mass Index)             67 1.524 m (5') 31.56 kg/m2          Blood Pressure from Last 3 Encounters:   18 133/87   18 125/71   18 137/87    Weight from Last 3 Encounters:   18 73.3 kg (161 lb 9.6 oz)   18 74.6 kg (164 lb 7.4 oz)   18 74.4 kg (164 lb)              Today, you had the  Patient is seen and examined at the bed side, is afebrile.        REVIEW OF SYSTEMS: All other review systems are negative      Vital Signs Last 24 Hrs  T(C): 36.7 (2018 21:16), Max: 37.1 (2018 05:41)  T(F): 98 (2018 21:16), Max: 98.8 (2018 05:41)  HR: 67 (2018 21:16) (67 - 80)  BP: 108/61 (2018 21:16) (106/57 - 119/69)  BP(mean): --  RR: 18 (2018 21:16) (18 - 18)  SpO2: 96% (2018 21:16) (95% - 97%)          ALLERGIES: PCN          PHYSICAL EXAM:  GENERAL: Not in distress, on VM  CVS: s1 and s2 present  RESP: Air entry B/L  GI: Abdomen soft and nontender  EXT: No pedal edema  CNS: Awake, alert and oriented          LABS:                                   10.4   5.86  )-----------( 193      ( 2018 07:39 )             33.4                10.5   4.61  )-----------( 184      ( 2018 07:31 )             33.6         04-17    134<L>  |  98  |  23  ----------------------------<  104<H>  4.4   |  25  |  1.42<H>    Ca    8.3<L>      2018 07:02        TPro  6.3  /  Alb  3.1<L>  /  TBili  0.7  /  DBili  0.2  /  AST  30  /  ALT  21  /  AlkPhos  78  04-15      Urinalysis Basic - ( 2018 21:55 )    Color: Yellow / Appearance: Clear / S.010 / pH: x  Gluc: x / Ketone: Negative  / Bili: Negative / Urobili: Negative mg/dL   Blood: x / Protein: Negative mg/dL / Nitrite: Negative   Leuk Esterase: Small / RBC: 14 /HPF / WBC 6 /HPF   Sq Epi: x / Non Sq Epi: 0 /HPF / Bacteria: Negative        MEDICATIONS  (STANDING):  ALBUTerol/ipratropium for Nebulization 3 milliLiter(s) Nebulizer every 6 hours  ATENolol  Tablet 12.5 milliGRAM(s) Oral daily  atorvastatin 20 milliGRAM(s) Oral at bedtime  buDESOnide   0.5 milliGRAM(s) Respule 0.5 milliGRAM(s) Inhalation every 12 hours  docusate sodium 100 milliGRAM(s) Oral two times a day  gabapentin 300 milliGRAM(s) Oral two times a day  heparin  Injectable 5000 Unit(s) SubCutaneous every 12 hours  multivitamin/minerals 1 Tablet(s) Oral daily  pantoprazole    Tablet 40 milliGRAM(s) Oral before breakfast  rivaroxaban 15 milliGRAM(s) Oral <User Schedule>  tamsulosin 0.8 milliGRAM(s) Oral daily  vancomycin  IVPB 1000 milliGRAM(s) IV Intermittent every 24 hours    MEDICATIONS  (PRN):  acetaminophen   Tablet. 650 milliGRAM(s) Oral every 6 hours PRN Mild Pain (1 - 3)  triazolam 0.25 milliGRAM(s) Oral at bedtime PRN Insomnia          RADIOLOGY & ADDITIONAL TESTS:    4/15/18: CT Chest No Cont (04.15.18 @ 17:10) IMPRESSION: Moderate bilateral pleural effusions with adjacent compressive atelectasis. Linear atelectasis in the right middle lobe and lingula.      18: Xray Chest 1 View- PORTABLE-Routine (18 @ 23:22) Small pleural effusions with adjacent likely atelectasis is unchanged.        MICROBIOLOGY DATA:    Urine Microscopic-Add On (NC) (18 @ 21:55)    Red Blood Cell - Urine: 14 /HPF    White Blood Cell - Urine: 6 /HPF    Bacteria: Negative    Epithelial Cells: 0 /HPF    Rapid Respiratory Viral Panel (18 @ 11:53)    Rapid RVP Result: Detected: The FilmArray RVP Rapid uses polymerase chain reaction (PCR) and melt  curve analysis to screen for adenovirus; coronavirus HKU1, NL63, 229E,  OC43; human metapneumovirus (hMPV); human enterovirus/rhinovirus  (Entero/RV); influenza A; influenza A/H1;influenza A/H3; influenza  A/H1-2009; influenza B; parainfluenza viruses 1, 2, 3, 4; respiratory  syncytial virus; Bordetella pertussis; Mycoplasma pneumoniae; and  Chlamydophila pneumoniae. following     No orders found for display       Primary Care Provider Office Phone # Fax #    Shelby Branch [phone]       [address] [facility] [address]  [facility] [address]        Equal Access to Services     DELANO DUNBAR : Hadii linda ku filipeo Sorobertoali, waaxda luqadaha, qaybta kaalmada adeegyada, cnadis deutsch joann macias. So [facility] [phone].    ATENCIÓN: Si habla español, tiene a whitaker disposición servicios gratuitos de asistencia lingüística. Llame al [phone].    We comply with applicable federal civil rights laws and [place] laws. We do not discriminate on the basis of race, color, national origin, age, disability, sex, sexual orientation, or gender identity.            Thank you!     Thank you for choosing [facility]  for your care. Our goal is always to provide you with excellent care. Hearing back from our patients is one way we can continue to improve our services. Please take a few minutes to complete the written survey that you may receive in the mail after your visit with us. Thank you!             Your Updated Medication List - Protect others around you: Learn how to safely use, store and throw away your medicines at www.disposemymeds.org.          This list is accurate as of 6/1/18  1:39 PM.  Always use your most recent med list.                   Brand Name Dispense Instructions for use Diagnosis    ibuprofen 600 MG tablet    ADVIL/MOTRIN    30 tablet    Take 1 tablet (600 mg) by mouth every 6 hours as needed for pain (mild)    S/P laparotomy, S/P ovarian cystectomy, S/P left oophorectomy, H/O bilateral salpingectomy       oxyCODONE IR 5 MG tablet    ROXICODONE    20 tablet    Take 1-2 tablets (5-10 mg) by mouth every 6 hours as needed for pain or moderate to severe pain (Moderate to Severe)    S/P laparotomy, S/P ovarian cystectomy, S/P left oophorectomy, H/O bilateral salpingectomy       senna-docusate 8.6-50 MG per tablet     SENOKOT-S;PERICOLACE    60 tablet    Take 1 tablet by mouth 2 times daily as needed for constipation    S/P laparotomy       * TYLENOL PO           * acetaminophen 325 MG tablet    TYLENOL    100 tablet    Take 2 tablets (650 mg) by mouth every 4 hours as needed for other (mild pain)    S/P laparotomy, S/P ovarian cystectomy, S/P left oophorectomy, H/O bilateral salpingectomy       * Notice:  This list has 2 medication(s) that are the same as other medications prescribed for you. Read the directions carefully, and ask your doctor or other care provider to review them with you.       Patient is seen and examined at the bed side, is afebrile.        REVIEW OF SYSTEMS: All other review systems are negative        Vital Signs Last 24 Hrs  T(C): 36.7 (2018 21:16), Max: 37.1 (2018 05:41)  T(F): 98 (2018 21:16), Max: 98.8 (2018 05:41)  HR: 67 (2018 21:16) (67 - 80)  BP: 108/61 (2018 21:16) (106/57 - 119/69)  BP(mean): --  RR: 18 (2018 21:16) (18 - 18)  SpO2: 96% (2018 21:16) (95% - 97%)          ALLERGIES: PCN          PHYSICAL EXAM:  GENERAL: Not in distress, on VM  CVS: s1 and s2 present  RESP: Air entry B/L  GI: Abdomen soft and nontender  EXT: No pedal edema  CNS: Awake, alert and oriented          LABS:                                   10.4   5.86  )-----------( 193      ( 2018 07:39 )             33.4                10.5   4.61  )-----------( 184      ( 2018 07:31 )             33.6         04-17    134<L>  |  98  |  23  ----------------------------<  104<H>  4.4   |  25  |  1.42<H>    Ca    8.3<L>      2018 07:02        TPro  6.3  /  Alb  3.1<L>  /  TBili  0.7  /  DBili  0.2  /  AST  30  /  ALT  21  /  AlkPhos  78  04-15      Urinalysis Basic - ( 2018 21:55 )    Color: Yellow / Appearance: Clear / S.010 / pH: x  Gluc: x / Ketone: Negative  / Bili: Negative / Urobili: Negative mg/dL   Blood: x / Protein: Negative mg/dL / Nitrite: Negative   Leuk Esterase: Small / RBC: 14 /HPF / WBC 6 /HPF   Sq Epi: x / Non Sq Epi: 0 /HPF / Bacteria: Negative        MEDICATIONS  (STANDING):  ALBUTerol/ipratropium for Nebulization 3 milliLiter(s) Nebulizer every 6 hours  ATENolol  Tablet 12.5 milliGRAM(s) Oral daily  atorvastatin 20 milliGRAM(s) Oral at bedtime  buDESOnide   0.5 milliGRAM(s) Respule 0.5 milliGRAM(s) Inhalation every 12 hours  docusate sodium 100 milliGRAM(s) Oral two times a day  gabapentin 300 milliGRAM(s) Oral two times a day  heparin  Injectable 5000 Unit(s) SubCutaneous every 12 hours  multivitamin/minerals 1 Tablet(s) Oral daily  pantoprazole    Tablet 40 milliGRAM(s) Oral before breakfast  rivaroxaban 15 milliGRAM(s) Oral <User Schedule>  tamsulosin 0.8 milliGRAM(s) Oral daily  vancomycin  IVPB 1000 milliGRAM(s) IV Intermittent every 24 hours    MEDICATIONS  (PRN):  acetaminophen   Tablet. 650 milliGRAM(s) Oral every 6 hours PRN Mild Pain (1 - 3)  triazolam 0.25 milliGRAM(s) Oral at bedtime PRN Insomnia          RADIOLOGY & ADDITIONAL TESTS:    4/15/18: CT Chest No Cont (04.15.18 @ 17:10) IMPRESSION: Moderate bilateral pleural effusions with adjacent compressive atelectasis. Linear atelectasis in the right middle lobe and lingula.      18: Xray Chest 1 View- PORTABLE-Routine (18 @ 23:22) Small pleural effusions with adjacent likely atelectasis is unchanged.        MICROBIOLOGY DATA:    Urine Microscopic-Add On (NC) (18 @ 21:55)    Red Blood Cell - Urine: 14 /HPF    White Blood Cell - Urine: 6 /HPF    Bacteria: Negative    Epithelial Cells: 0 /HPF    Rapid Respiratory Viral Panel (18 @ 11:53)    Rapid RVP Result: Detected: The FilmArray RVP Rapid uses polymerase chain reaction (PCR) and melt  curve analysis to screen for adenovirus; coronavirus HKU1, NL63, 229E,  OC43; human metapneumovirus (hMPV); human enterovirus/rhinovirus  (Entero/RV); influenza A; influenza A/H1;influenza A/H3; influenza  A/H1-2009; influenza B; parainfluenza viruses 1, 2, 3, 4; respiratory  syncytial virus; Bordetella pertussis; Mycoplasma pneumoniae; and  Chlamydophila pneumoniae.

## 2018-06-01 NOTE — PROGRESS NOTES
[facility]  Postoperative Visit  2018    S: Kasandra Dasilva is a 47 year old  here for post-operative visit following a mini laparotomy, left oophorectomy, bilateral salpingectomy, right ovarian cystectomy, and pap smear on 18 with Dr. Odonnell. Pathology revealed bilateral mature cystic teratomas. Her pap smear was NILM with negative HPV and the fallopian tubes were normal. She reports feeling well. Her pain has been well controlled with Tylenol. She has no problems with bowel movements or urination. She has been eating and drinking ok. No fevers or drainage from her incision.    O:   /87 (BP Location: Right arm, Patient Position: Chair)  Pulse 67  Ht 1.524 m (5')  Wt 73.3 kg (161 lb 9.6 oz)  BMI 31.56 kg/m2  Exam:   Gen: NAD  Abd: Soft, non-distended, non tender. Incision clean, dry, intact. Steri strips removed.    Labs:   Hemoglobin   Date Value Ref Range Status   2018 10.5 (L) 11.7 - 15.7 g/dL Final       Pathology:   Copath Report   Date Value Ref Range Status   2018   Final    Patient Name: KASANDRA ROYAL  MR#: [id number]  Specimen #: [id number]  Collected: 2018  Received: 2018  Reported: 2018 13:11  Ordering Phy(s): GIOVANNA ODONNELL    For improved result formatting, select 'View Enhanced Report Format' under   Linked Documents section.    SPECIMEN(S):  A: Ovary, left  B: Fallopian tube, left  C: Ovary, right  D: Fallopian tube, right    FINAL DIAGNOSIS:  A. OVARY, LEFT, OOPHORECTOMY:  - Mature cystic teratoma    B. FALLOPIAN TUBE, LEFT, SALPINGECTOMY:  - Fallopian tube with no significant histologic abnormality    C. OVARY, RIGHT, OOPHORECTOMY:  - Mature cystic teratoma    D. FALLOPIAN TUBE, RIGHT, SALPINGECTOMY:  - Fallopian tube with no significant histologic abnormality    I have personally reviewed all specimens and/or slides, including the   listed special stains, and used them  with my medical judgement to determine or confirm  "the final diagnosis.    Electronically signed out by:    Justin James M.D., PhD, Physicians    CLINICAL HISTORY:  Pelvic pain    GROSS:  A: The specimen is received in formalin with proper patient   identification, labeled \"left ovary\".  The  specimen consists of a 283.0 g intact ovary.  The specimen measures 11.5 x   8.0 x 6.2 cm.  The external surface  is tan, smooth and dull with two tan-yellow ear post tissue nodules   measuring 4.0 cm in greatest dimension.  The specimen is multi-sected to reveal yellow-tan keratinous material   within the center and smooth inner  lining.  No masses, lesions or other gross abnormalities are identified.    Photo taken.  Representative  sections are submitted as follows:    Summary of Sections:  A1-A4 - random sections of cystic wall and keratinous material  A5 - potential ovary    B: The specimen is received in formalin with the proper patient   identification, labeled \"left fallopian tube\".   The specimen consists of a tubular segment of purple-tan soft tissue with   fimbriated ends consistent with a  fallopian tube measuring 3.2 cm in length and 0.9 cm in diameter.   The   specimen is serially sectioned to  reveal a grossly complete cross section of fallopian tube with a pin-point   lumen.  Representative sections are  submitted in cassette B1.    C: The specimen is received in formalin with proper patient   identification, labeled \"right ovarian dermoid\".  The specimen consists of two tan-gray membranous tissues and one intact   cystic structure.  The entire specimen  weighs 162.3 g.  The first membranous tissue measures 6.0 x 2.0 x 0.2 cm   and the second membranous tissue  measures 6.2 x 2.0 x 0.2 cm.  The cystic structure measures 8.0 x 7.0 x   0.5 cm.  The specimen is multi-sected  to reveal a tan yellow keratinous material in the center and smooth inner   lining.  No masses, lesions or other  gross abnormalities are identified.  Photo taken.  Representative " "sections   are stated as follows:    Summary of Sections:  C1 - portions of smallest membranous tissue  C2 - portions of largest membranous tissue  C3-C5 - portions of cystic structure wall    D: The specimen is received in formalin with the proper patient   identification, labeled \"right fallopian  tube\".  The specimen consists of a tubular segment of purple-tan soft   tissue with fimbriated ends consistent  with a fallopian tube measuring 7.5 cm in length and 0.7 cm in diameter.     The specimen is thoroughly  sectioned to reveal a grossly complete cross section of fallopian tube   with a pin-point lumen.  Representative  sections are submitted in cassette D1. (Dictated by: Estefany Gil   2018 09:22 AM)    MICROSCOPIC:  Microscopic examination was performed.    CPT Codes:  A: 46028-LS8  B: 19758-TE2  C: 93317-UM1  D: 13892-JF3    TESTING LAB LOCATION:  [address] [id number] [phone]    COLLECTION SITE:  Client: [facility]  Location: [facility] (B)           A&P: 47 year old  here for post-operative visit following a mini laparotomy, left oophorectomy, bilateral salpingectomy, right ovarian cystectomy, and pap smear on 18 with Dr. Branch.     Pathology revealed bilateral mature cystic teratomas. Her pap smear was NILM with negative HPV and the fallopian tubes were normal. Patient doing well. No concerns, exam benign. Discussed that patient will not require additional follow-up/surgery for the teratomas. She was instructed to refrain from heavy lifting for 4-6 weeks. Work note was signed. Return as needed.    Patient seen with Dr. Norman Smith MD  OBGYN PGY-1  2018, 10:53 AM    [facility] staff:  Appreciate note by Dr. Smith.  I have seen and examined the patient without the resident. I have reviewed, edited, and agree with the note. "      Shanti Austin MD, FACOG  6/1/2018  1:38 PM

## 2018-06-01 NOTE — LETTER
2018     RE: Kasandra Dasilva  [address]  [facility] [address]     Dear Colleague,    Thank you for referring your patient, Kasandra Dasilva, to the [facility] at [facility]. Please see a copy of my visit note below.    [facility]  Postoperative Visit  2018    S: Kasandra Dasilva is a 47 year old  here for post-operative visit following a mini laparotomy, left oophorectomy, bilateral salpingectomy, right ovarian cystectomy, and pap smear on 18 with Dr. Odonnell. Pathology revealed bilateral mature cystic teratomas. Her pap smear was NILM with negative HPV and the fallopian tubes were normal. She reports feeling well. Her pain has been well controlled with Tylenol. She has no problems with bowel movements or urination. She has been eating and drinking ok. No fevers or drainage from her incision.    O:   /87 (BP Location: Right arm, Patient Position: Chair)  Pulse 67  Ht 1.524 m (5')  Wt 73.3 kg (161 lb 9.6 oz)  BMI 31.56 kg/m2  Exam:   Gen: NAD  Abd: Soft, non-distended, non tender. Incision clean, dry, intact. Steri strips removed.    Labs:   Hemoglobin   Date Value Ref Range Status   2018 10.5 (L) 11.7 - 15.7 g/dL Final       Pathology:   Copath Report   Date Value Ref Range Status   2018   Final    Patient Name: KASANDRA ROYAL  MR#: [id number]  Specimen #: [id number]  Collected: 2018  Received: 2018  Reported: 2018 13:11  Ordering Phy(s): GIOVANNA ODONNELL    For improved result formatting, select 'View Enhanced Report Format' under   Linked Documents section.    SPECIMEN(S):  A: Ovary, left  B: Fallopian tube, left  C: Ovary, right  D: Fallopian tube, right    FINAL DIAGNOSIS:  A. OVARY, LEFT, OOPHORECTOMY:  - Mature cystic teratoma    B. FALLOPIAN TUBE, LEFT, SALPINGECTOMY:  - Fallopian tube with no significant histologic abnormality    C. OVARY,  "RIGHT, OOPHORECTOMY:  - Mature cystic teratoma    D. FALLOPIAN TUBE, RIGHT, SALPINGECTOMY:  - Fallopian tube with no significant histologic abnormality    I have personally reviewed all specimens and/or slides, including the   listed special stains, and used them  with my medical judgement to determine or confirm the final diagnosis.    Electronically signed out by:    Justin James M.D., PhD, Physicians    CLINICAL HISTORY:  Pelvic pain    GROSS:  A: The specimen is received in formalin with proper patient   identification, labeled \"left ovary\".  The  specimen consists of a 283.0 g intact ovary.  The specimen measures 11.5 x   8.0 x 6.2 cm.  The external surface  is tan, smooth and dull with two tan-yellow ear post tissue nodules   measuring 4.0 cm in greatest dimension.  The specimen is multi-sected to reveal yellow-tan keratinous material   within the center and smooth inner  lining.  No masses, lesions or other gross abnormalities are identified.    Photo taken.  Representative  sections are submitted as follows:    Summary of Sections:  A1-A4 - random sections of cystic wall and keratinous material  A5 - potential ovary    B: The specimen is received in formalin with the proper patient   identification, labeled \"left fallopian tube\".   The specimen consists of a tubular segment of purple-tan soft tissue with   fimbriated ends consistent with a  fallopian tube measuring 3.2 cm in length and 0.9 cm in diameter.   The   specimen is serially sectioned to  reveal a grossly complete cross section of fallopian tube with a pin-point   lumen.  Representative sections are  submitted in cassette B1.    C: The specimen is received in formalin with proper patient   identification, labeled \"right ovarian dermoid\".  The specimen consists of two tan-gray membranous tissues and one intact   cystic structure.  The entire specimen  weighs 162.3 g.  The first membranous tissue measures 6.0 x 2.0 x 0.2 cm   and the second " "membranous tissue  measures 6.2 x 2.0 x 0.2 cm.  The cystic structure measures 8.0 x 7.0 x   0.5 cm.  The specimen is multi-sected  to reveal a tan yellow keratinous material in the center and smooth inner   lining.  No masses, lesions or other  gross abnormalities are identified.  Photo taken.  Representative sections   are stated as follows:    Summary of Sections:  C1 - portions of smallest membranous tissue  C2 - portions of largest membranous tissue  C3-C5 - portions of cystic structure wall    D: The specimen is received in formalin with the proper patient   identification, labeled \"right fallopian  tube\".  The specimen consists of a tubular segment of purple-tan soft   tissue with fimbriated ends consistent  with a fallopian tube measuring 7.5 cm in length and 0.7 cm in diameter.     The specimen is thoroughly  sectioned to reveal a grossly complete cross section of fallopian tube   with a pin-point lumen.  Representative  sections are submitted in cassette D1. (Dictated by: Estefany Gil   2018 09:22 AM)    MICROSCOPIC:  Microscopic examination was performed.    CPT Codes:  A: 49120-JM0  B: 15028-SJ1  C: 19593-XQ6  D: 08233-JL2    TESTING LAB LOCATION:  [address] [address] [phone]    COLLECTION SITE:  Client: [facility]  Location: UR[facility] (B)           A&P: 47 year old  here for post-operative visit following a mini laparotomy, left oophorectomy, bilateral salpingectomy, right ovarian cystectomy, and pap smear on 18 with Dr. Branch.     Pathology revealed bilateral mature cystic teratomas. Her pap smear was NILM with negative HPV and the fallopian tubes were normal. Patient doing well. No concerns, exam benign. Discussed that patient will not require additional follow-up/surgery for the teratomas. She was instructed to refrain from heavy lifting for 4-6 " weeks. Work note was signed. Return as needed.    Patient seen with Dr. Norman Smith MD  OBGYN PGY-1  6/1/2018, 10:53 AM    [facility] staff:  Appreciate note by Dr. Smith.  I have seen and examined the patient without the resident. I have reviewed, edited, and agree with the note.      Shanti Austin MD, FACOG  6/1/2018  1:38 PM

## 2019-03-29 ENCOUNTER — OFFICE VISIT (OUTPATIENT)
Dept: FAMILY MEDICINE | Facility: CLINIC | Age: 49
End: 2019-03-29
Payer: COMMERCIAL

## 2019-03-29 VITALS
DIASTOLIC BLOOD PRESSURE: 81 MMHG | HEIGHT: 62 IN | TEMPERATURE: 98.3 F | OXYGEN SATURATION: 100 % | BODY MASS INDEX: 31.1 KG/M2 | WEIGHT: 169 LBS | HEART RATE: 63 BPM | SYSTOLIC BLOOD PRESSURE: 127 MMHG

## 2019-03-29 DIAGNOSIS — N39.44 BED WETTING: Primary | ICD-10-CM

## 2019-03-29 ASSESSMENT — MIFFLIN-ST. JEOR: SCORE: 1358.08

## 2019-03-30 NOTE — PROGRESS NOTES
SUBJECTIVE:  The patient is here with her daughter and .  The  is acting as an .  She is here because of a 5-6 year history of what really sounds like bed wetting, which happens on average 1 time per month over the year.  She has absolutely no daytime symptoms of urinary incontinence, and she will just end up waking up in the middle of the night having urinated without any warning symptoms.  She is from [place] and did see, it sounds like, just a general doctor about it, and it sounds like she had some basic urine tests, which were fine, so nothing further was done.  She has had some episodes of what has been called cystitis where she had typical symptoms and was given antibiotics, and those cleared up, but this is clearly different and unrelated.  He did say that sometimes when they are having relations or sex that she will ooze some urine at that time, also.  Again, she is in good health.  She did [place] but there was no change in her symptoms after the surgery.  I asked whether she had prolonged bed wetting as a child, and she did not know.  I asked if she was usually a deep sleeper and hard to wake up at night, was a sleep walker, talked in her sleep, anything unusual with her sleep.      OBJECTIVE:  On exam, the patient is in no acute distress.  Vital signs stable.  No further exam was done.      IMPRESSION:  Patient with history of wetting the bed at night with no associated symptoms, upon average about 1 time a month for the past 5-6 years, uncertain etiology.      PLAN:  I am going to just go ahead and refer to Urology about getting any urine tests now, since that had been done in the past, and we will ultimately want to do that.  I suspect that it may be something more related to her sleep patterns and depth of sleep, which is often the case with children bed wetters, but we will leave that to the urologist to try to sort out, and the patient  was fine with that plan.        Visit length was 15 minutes, all spent counseling about symptoms and plan.

## 2019-04-09 ENCOUNTER — PRE VISIT (OUTPATIENT)
Dept: UROLOGY | Facility: CLINIC | Age: 49
End: 2019-04-09

## 2019-04-09 NOTE — TELEPHONE ENCOUNTER
MEDICAL RECORDS REQUEST   [facility]  Urology Clinic  [address] [place] [address]  PHONE: [phone]  Fax: [phone]        FUTURE VISIT INFORMATION                                                   Kasandra Dasilva, : 1970 scheduled for future visit at [facility]    APPOINTMENT INFORMATION:    Date: 2019    Provider:  NEERAJ KELLY    Reason for Visit/Diagnosis: BED WETTING    REFERRAL INFORMATION:    Referring provider:  PAMELLA MIMS    Specialty: MD    Referring providers clinic:  [facility]    Clinic contact number:  [phone];    RECORDS REQUESTED FOR VISIT                                                     NOTES  STATUS/DETAILS   OFFICE NOTE from referring provider  yes   OFFICE NOTE from other specialist  no   DISCHARGE SUMMARY from hospital  no   DISCHARGE REPORT from the ER  no   OPERATIVE REPORT  no   MEDICATION LIST  no       PRE-VISIT CHECKLIST      Record collection complete Yes   Appointment appropriately scheduled           (right time/right provider) Yes   MyChart activation If no, please explain DECLINED   Questionnaire complete If no, please explain IN PROCESS     Completed by: Neeraj Villatoro

## 2019-04-23 ENCOUNTER — PRE VISIT (OUTPATIENT)
Dept: UROLOGY | Facility: CLINIC | Age: 49
End: 2019-04-23

## 2019-04-23 NOTE — TELEPHONE ENCOUNTER
Reason for visit: nocturnal enuresis      Relevant information: pt referred by Dr. Mason    Records/imaging/labs/orders: Record available    Pt called: no need for a call    At Rooming: dip/pvr

## 2019-04-25 ENCOUNTER — OFFICE VISIT (OUTPATIENT)
Dept: UROLOGY | Facility: CLINIC | Age: 49
End: 2019-04-25
Attending: FAMILY MEDICINE
Payer: COMMERCIAL

## 2019-04-25 VITALS
HEART RATE: 67 BPM | DIASTOLIC BLOOD PRESSURE: 88 MMHG | BODY MASS INDEX: 30.36 KG/M2 | HEIGHT: 62 IN | SYSTOLIC BLOOD PRESSURE: 143 MMHG | WEIGHT: 165 LBS

## 2019-04-25 DIAGNOSIS — N39.44 NOCTURNAL ENURESIS: Primary | ICD-10-CM

## 2019-04-25 LAB
APPEARANCE UR: CLEAR
BILIRUB UR QL: NORMAL
COLOR UR: YELLOW
GLUCOSE URINE: NORMAL MG/DL
HGB UR QL: NORMAL
KETONES UR QL: NORMAL MG/DL
LEUKOCYTE ESTERASE URINE: NORMAL
NITRITE UR QL STRIP: NORMAL
PH UR STRIP: 5.5 PH (ref 5–7)
PROTEIN ALBUMIN URINE: 30 MG/DL
SOURCE: NORMAL
SP GR UR STRIP: 1.02 (ref 1–1.03)
UROBILINOGEN UR QL STRIP: 0.2 EU/DL (ref 0.2–1)

## 2019-04-25 ASSESSMENT — ENCOUNTER SYMPTOMS
BACK PAIN: 0
HEADACHES: 0
TINGLING: 0
EYE REDNESS: 0
RESPIRATORY PAIN: 0
LEG SWELLING: 0
DECREASED LIBIDO: 0
DIARRHEA: 0
DOUBLE VISION: 0
CHILLS: 0
HALLUCINATIONS: 0
TROUBLE SWALLOWING: 0
NUMBNESS: 0
INCREASED ENERGY: 0
SHORTNESS OF BREATH: 0
JAUNDICE: 0
WEIGHT GAIN: 0
WEAKNESS: 0
DECREASED APPETITE: 0
DIZZINESS: 0
POSTURAL DYSPNEA: 0
DYSPNEA ON EXERTION: 0
HEMATURIA: 0
SPUTUM PRODUCTION: 0
BOWEL INCONTINENCE: 0
ALTERED TEMPERATURE REGULATION: 0
POOR WOUND HEALING: 0
FATIGUE: 0
COUGH DISTURBING SLEEP: 0
PANIC: 0
BREAST MASS: 0
BLOATING: 0
NECK MASS: 0
SPEECH CHANGE: 0
NERVOUS/ANXIOUS: 0
CONSTIPATION: 0
WHEEZING: 0
CLAUDICATION: 0
MUSCLE CRAMPS: 0
STIFFNESS: 0
SEIZURES: 0
BLOOD IN STOOL: 0
ARTHRALGIAS: 0
BREAST PAIN: 0
ORTHOPNEA: 0
PARALYSIS: 0
NAUSEA: 0
EYE IRRITATION: 0
EXTREMITY NUMBNESS: 0
POLYDIPSIA: 0
POLYPHAGIA: 0
FLANK PAIN: 0
TACHYCARDIA: 0
LOSS OF CONSCIOUSNESS: 0
FEVER: 0
SWOLLEN GLANDS: 0
NAIL CHANGES: 0
TREMORS: 0
LEG PAIN: 0
MUSCLE WEAKNESS: 0
RECTAL PAIN: 0
HOT FLASHES: 0
SORE THROAT: 0
BRUISES/BLEEDS EASILY: 0
TASTE DISTURBANCE: 0
INSOMNIA: 0
HOARSE VOICE: 0
HEARTBURN: 0
EYE WATERING: 0
ABDOMINAL PAIN: 0
DECREASED CONCENTRATION: 0
DIFFICULTY URINATING: 0
SKIN CHANGES: 0
LIGHT-HEADEDNESS: 0
PALPITATIONS: 0
SINUS PAIN: 0
NECK PAIN: 0
SYNCOPE: 0
COUGH: 0
EXERCISE INTOLERANCE: 0
HYPERTENSION: 0
DEPRESSION: 0
JOINT SWELLING: 0
EYE PAIN: 0
WEIGHT LOSS: 0
RECTAL BLEEDING: 0
SLEEP DISTURBANCES DUE TO BREATHING: 0
MEMORY LOSS: 0
DISTURBANCES IN COORDINATION: 0
DYSURIA: 0
NIGHT SWEATS: 0
MYALGIAS: 0
VOMITING: 0
HYPOTENSION: 0
SNORES LOUDLY: 0
SMELL DISTURBANCE: 0
HEMOPTYSIS: 0
SINUS CONGESTION: 0

## 2019-04-25 ASSESSMENT — MIFFLIN-ST. JEOR: SCORE: 1340.02

## 2019-04-25 ASSESSMENT — PAIN SCALES - GENERAL: PAINLEVEL: NO PAIN (0)

## 2019-04-25 NOTE — PATIENT INSTRUCTIONS
Please do the bladder diary    Return in 6 weeks to discuss results    Websites with free information:    [employer] patient website: www.voicesforpfd.org    Total Control Program: www.totalcontrolprogram.com    It was a pleasure meeting with you today.  Thank you for allowing me and my team the privilege of caring for you today.  YOU are the reason we are here, and I truly hope we provided you with the excellent service you deserve.  Please let us know if there is anything else we can do for you so that we can be sure you are leaving completely satisfied with your care experience.

## 2019-04-25 NOTE — LETTER
RE: Kasandra Dasilva  [address] [facility] [address]     Dear Colleague,    Thank you for referring your patient, Kasandra Dasilva, to the [facility] AND [facility] UROLOGIC [facility]CERS at [facility]. Please see a copy of my visit note below.    April 25, 2019    Referring Provider: Luly Mason MD  [address] [address]    Primary Care Provider: Shelby Branch    CC: Bed wetting    HPI:  Kasandra Dasilva is a 48 year old female who presents for evaluation of her pelvic floor symptoms.  She is primarily [place] speaking and the entire visit today is conducted with the assitance of an .  She is accompanied today by her .  She is being seen at the request of Dr. Luly Mason for several years nocturnal enuresis occurring approximately once monthly without preceding symptoms. She once thought it was related to intercourse or her menses, but no change.  No history of daytime urinary incontinence. Her  wonders if there is any stress that contributes.      History of cystitis 25 years ago.  Denies gross hematuria, UTI, urinary incontinence, constipation, dyspareunia    Denies any history of genitourinary disease or malignancy    Health maintenance screening:  Pap smear 5/18/2018 NILM, HPV negative  Colonoscopy n/a  Mammogram n/a    Past Medical History:   Diagnosis Date     Dermoid cyst of ovary      Diverticula of intestine      HTN (hypertension)      Past Surgical History:   Procedure Laterality Date     CYSTECTOMY OVARIAN BENIGN Right 5/18/2018    Procedure: CYSTECTOMY OVARIAN BENIGN;;  Surgeon: Shelby Branch MD;  Location: [facility] OR     EXAM UNDER ANESTHESIA PELVIC N/A 5/18/2018    Procedure: EXAM UNDER ANESTHESIA PELVIC;  pap smear and pelvic examn  ;  Surgeon: Shelby Branch MD;  Location: [facility] OR     LAPAROTOMY MINI FOR EXPOSURE N/A 5/18/2018     Procedure: LAPAROTOMY MINI FOR EXPOSURE;  Mini laparotomy, Right Cystectomy, Bilateral Salpingectomy, Left Oophorectomy, pelvic exam and pap smear ;  Surgeon: Shelby Branch MD;  Location: [facility] OR     OOPHORECTOMY Left 5/18/2018    Procedure: OOPHORECTOMY;;  Surgeon: Shelby Branch MD;  Location: [facility] OR     SALPINGECTOMY Bilateral 5/18/2018    Procedure: SALPINGECTOMY;;  Surgeon: Shelby Branch MD;  Location: [facility] OR     Social History     Socioeconomic History     Marital status:      Spouse name: Not on file     Number of children: Not on file     Years of education: Not on file     Highest education level: Not on file   Occupational History     Not on file   Social Needs     Financial resource strain: Not on file     Food insecurity:     Worry: Not on file     Inability: Not on file     Transportation needs:     Medical: Not on file     Non-medical: Not on file   Tobacco Use     Smoking status: Never Smoker     Smokeless tobacco: Never Used   Substance and Sexual Activity     Alcohol use: Not on file     Drug use: Not on file     Sexual activity: Not on file   Lifestyle     Physical activity:     Days per week: Not on file     Minutes per session: Not on file     Stress: Not on file   Relationships     Social connections:     Talks on phone: Not on file     Gets together: Not on file     Attends Protestant service: Not on file     Active member of club or organization: Not on file     Attends meetings of clubs or organizations: Not on file     Relationship status: Not on file     Intimate partner violence:     Fear of current or ex partner: Not on file     Emotionally abused: Not on file     Physically abused: Not on file     Forced sexual activity: Not on file   Other Topics Concern     Not on file   Social History Narrative    Lives with 3 children and . Works at a [employer] (4/10/2018)     Family History   Problem Relation Age of Onset     Diabetes Father 77     No Known  "Allergies    Current Outpatient Medications   Medication     Acetaminophen (TYLENOL PO)     No current facility-administered medications for this visit.      /88   Pulse 67   Ht 1.58 m (5' 2.21\")   Wt 74.8 kg (165 lb)   LMP 03/29/2019 (Exact Date)   BMI 29.98 kg/m    Patient's last menstrual period was 03/29/2019 (exact date). Body mass index is 29.98 kg/m .  She is alert and oriented.  She is well groomed, comfortable in no acute distress.  Eyes have anicteric sclerae, moist conjunctivae.  Normal mood and affect.   Normocephalic, atraumatic without masses, lesions, obvious abnormalities.  Non-labored breathing.  Abdomen is soft, non-tender, non-distended, no CVAT, no organomegaly.     Urine dip trace leuks    PVR 39 mL by bladder scan    A/P: Kasandra Dasilva is a 48 year old F with intermittent nocturnal enuresis    Start with a bladder dairy to assess for modifiable factors    RTC 6 weeks, sooner if needed    Attestation:  I agree with the PFSH and ROS as completed by the MS, except for changes made above as needed.  The remainder of the encounter was performed by me and scribed by the MS.  The scribed note accurately reflects my personal services and the decisions made by me.    30 minutes were spent with the patient today, > 50% in counseling and coordination of care    Aparna Castillo MD MPH    Urology  "

## 2019-04-25 NOTE — NURSING NOTE
"Chief Complaint   Patient presents with     Consult For      nocturnal enuresis       Blood pressure 143/88, pulse 67, height 1.58 m (5' 2.21\"), weight 74.8 kg (165 lb), last menstrual period 03/29/2019. Body mass index is 29.98 kg/m .    Patient Active Problem List   Diagnosis     Bilateral dermoid cysts of ovaries     Posterior dislocation of elbow, closed, right, sequela     Iron deficiency anemia, unspecified iron deficiency anemia type     Elevated blood pressure       No Known Allergies    Current Outpatient Medications   Medication Sig Dispense Refill     Acetaminophen (TYLENOL PO)        acetaminophen (TYLENOL) 325 MG tablet Take 2 tablets (650 mg) by mouth every 4 hours as needed for other (mild pain) (Patient not taking: Reported on 6/1/2018) 100 tablet 0     ibuprofen (ADVIL/MOTRIN) 600 MG tablet Take 1 tablet (600 mg) by mouth every 6 hours as needed for pain (mild) (Patient not taking: Reported on 6/1/2018) 30 tablet 0     oxyCODONE IR (ROXICODONE) 5 MG tablet Take 1-2 tablets (5-10 mg) by mouth every 6 hours as needed for pain or moderate to severe pain (Moderate to Severe) (Patient not taking: Reported on 6/1/2018) 20 tablet 0     senna-docusate (SENOKOT-S;PERICOLACE) 8.6-50 MG per tablet Take 1 tablet by mouth 2 times daily as needed for constipation (Patient not taking: Reported on 6/1/2018) 60 tablet 1       Social History     Tobacco Use     Smoking status: Never Smoker     Smokeless tobacco: Never Used   Substance Use Topics     Alcohol use: None     Drug use: None       Belkis Austin LPN  4/25/2019  2:41 PM     "

## 2019-04-25 NOTE — PROGRESS NOTES
April 25, 2019    Referring Provider: Luly Mason MD  [address] [address]    Primary Care Provider: Shelby Branch    CC: Bed wetting    HPI:  Kasandra Dasilva is a 48 year old female who presents for evaluation of her pelvic floor symptoms.  She is primarily Bengali speaking and the entire visit today is conducted with the assitance of an .  She is accompanied today by her .  She is being seen at the request of Dr. Luly Mason for several years nocturnal enuresis occurring approximately once monthly without preceding symptoms. She once thought it was related to intercourse or her menses, but no change.  No history of daytime urinary incontinence. Her  wonders if there is any stress that contributes.      History of cystitis 25 years ago.  Denies gross hematuria, UTI, urinary incontinence, constipation, dyspareunia    Denies any history of genitourinary disease or malignancy    Health maintenance screening:  Pap smear 5/18/2018 NILM, HPV negative  Colonoscopy n/a  Mammogram n/a    Past Medical History:   Diagnosis Date     Dermoid cyst of ovary      Diverticula of intestine      HTN (hypertension)      Past Surgical History:   Procedure Laterality Date     CYSTECTOMY OVARIAN BENIGN Right 5/18/2018    Procedure: CYSTECTOMY OVARIAN BENIGN;;  Surgeon: Shelby Branch MD;  Location[facility]     EXAM UNDER ANESTHESIA PELVIC N/A 5/18/2018    Procedure: EXAM UNDER ANESTHESIA PELVIC;  pap smear and pelvic examn  ;  Surgeon: Shelby Branch MD;  Location: [facility]     LAPAROTOMY MINI FOR EXPOSURE N/A 5/18/2018    Procedure: LAPAROTOMY MINI FOR EXPOSURE;  Mini laparotomy, Right Cystectomy, Bilateral Salpingectomy, Left Oophorectomy, pelvic exam and pap smear ;  Surgeon: Shelby Branch MD;  [facility]: [facility]     OOPHORECTOMY Left 5/18/2018    Procedure: OOPHORECTOMY;;  Surgeon: Shelby Branch MD;  [facility]: [facility]      SALPINGECTOMY Bilateral 5/18/2018    Procedure: SALPINGECTOMY;;  Surgeon: Shelby Branch MD;  Location: [facility]     Social History     Socioeconomic History     Marital status:      Spouse name: Not on file     Number of children: Not on file     Years of education: Not on file     Highest education level: Not on file   Occupational History     Not on file   Social Needs     Financial resource strain: Not on file     Food insecurity:     Worry: Not on file     Inability: Not on file     Transportation needs:     Medical: Not on file     Non-medical: Not on file   Tobacco Use     Smoking status: Never Smoker     Smokeless tobacco: Never Used   Substance and Sexual Activity     Alcohol use: Not on file     Drug use: Not on file     Sexual activity: Not on file   Lifestyle     Physical activity:     Days per week: Not on file     Minutes per session: Not on file     Stress: Not on file   Relationships     Social connections:     Talks on phone: Not on file     Gets together: Not on file     Attends Alevism service: Not on file     Active member of club or organization: Not on file     Attends meetings of clubs or organizations: Not on file     Relationship status: Not on file     Intimate partner violence:     Fear of current or ex partner: Not on file     Emotionally abused: Not on file     Physically abused: Not on file     Forced sexual activity: Not on file   Other Topics Concern     Not on file   Social History Narrative    Lives with 3 children and . 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   HENT:  Negative for ear pain, hearing loss, tinnitus,  nosebleeds, trouble swallowing, hoarse voice, mouth sores, sore throat, ear discharge, tooth pain, gum tenderness, taste disturbance, smell disturbance, hearing aid, bleeding gums, dry mouth, sinus pain, sinus congestion and neck mass.    Eyes:  Negative for double vision, pain, redness, eye pain, decreased vision, eye watering, eye bulging, eye dryness, flashing lights, spots, floaters, strabismus, tunnel vision, jaundice and eye irritation.   Respiratory:   Negative for cough, hemoptysis, sputum production, shortness of breath, wheezing, sleep disturbances due to breathing, snores loudly, respiratory pain, dyspnea on exertion, cough disturbing sleep and postural dyspnea.    Cardiovascular:  Negative for chest pain, dyspnea on exertion, palpitations, orthopnea, claudication, leg swelling, fingers/toes turn blue, hypertension, hypotension, syncope, history of heart murmur, chest pain on exertion, chest pain at rest, pacemaker, few scattered varicosities, leg pain, sleep disturbances due to breathing, tachycardia, light-headedness, exercise intolerance and edema.   Gastrointestinal:  Negative for heartburn, nausea, vomiting, abdominal pain, diarrhea, constipation, blood in stool, melena, rectal pain, bloating, hemorrhoids, bowel incontinence, jaundice, rectal bleeding, coffee ground emesis and change in stool.   Genitourinary:  Positive for bladder incontinence and nocturia. Negative for dysuria, urgency, hematuria, flank pain, vaginal discharge, difficulty urinating, genital sores, dyspareunia, decreased libido, voiding less frequently, arousal difficulty, abnormal vaginal bleeding, excessive menstruation, menstrual changes, hot flashes, vaginal dryness and postmenopausal bleeding.   Musculoskeletal:  Negative for myalgias, back pain, joint swelling, arthralgias, stiffness, muscle cramps, neck pain, bone pain, muscle weakness and fracture.   Skin:  Negative for nail changes, itching, poor wound healing, rash, hair  "changes, skin changes, acne, warts, poor wound healing, scarring, flaky skin, Raynaud's phenomenon, sensitivity to sunlight and skin thickening.   Neurological:  Negative for dizziness, tingling, tremors, speech change, seizures, loss of consciousness, weakness, light-headedness, numbness, headaches, disturbances in coordination, extremity numbness, memory loss, difficulty walking and paralysis.   Endo/Heme:  Negative for anemia, swollen glands and bruises/bleeds easily.   Psychiatric/Behavioral:  Negative for depression, hallucinations, memory loss, decreased concentration, mood swings and panic attacks.    Breast:  Negative for breast discharge, breast mass, breast pain and nipple retraction.   Endocrine:  Negative for altered temperature regulation, polyphagia, polydipsia, unwanted hair growth and change in facial hair.    No Known Allergies    Current Outpatient Medications   Medication     Acetaminophen (TYLENOL PO)     No current facility-administered medications for this visit.      /88   Pulse 67   Ht 1.58 m (5' 2.21\")   Wt 74.8 kg (165 lb)   LMP 03/29/2019 (Exact Date)   BMI 29.98 kg/m   Patient's last menstrual period was 03/29/2019 (exact date). Body mass index is 29.98 kg/m .  She is alert and oriented.  She is well groomed, comfortable in no acute distress.  Eyes have anicteric sclerae, moist conjunctivae.  Normal mood and affect.   Normocephalic, atraumatic without masses, lesions, obvious abnormalities.  Non-labored breathing.  Abdomen is soft, non-tender, non-distended, no CVAT, no organomegaly.     Urine dip trace leuks    PVR 39 mL by bladder scan    A/P: Kasandra Dasilva is a 48 year old F with intermittent nocturnal enuresis    Start with a bladder dairy to assess for modifiable factors    RTC 6 weeks, sooner if needed    Attestation:  I agree with the PFSH and ROS as completed by the MS, except for changes made above as needed.  The remainder of the encounter was performed by " me and scribed by the MS.  The scribed note accurately reflects my personal services and the decisions made by me.    30 minutes were spent with the patient today, > 50% in counseling and coordination of care    Aparna Castillo MD MPH    Urology

## 2019-05-21 ENCOUNTER — TELEPHONE (OUTPATIENT)
Dept: UROLOGY | Facility: CLINIC | Age: 49
End: 2019-05-21

## 2019-05-21 NOTE — TELEPHONE ENCOUNTER
Reason for visit: review bladder diary     Relevant information: history of nocturnal enuresis    Records/imaging/labs/orders: all records available    Pt called: no need for a call    At Rooming: regular

## 2019-05-30 ENCOUNTER — OFFICE VISIT (OUTPATIENT)
Dept: UROLOGY | Facility: CLINIC | Age: 49
End: 2019-05-30
Payer: COMMERCIAL

## 2019-05-30 VITALS
DIASTOLIC BLOOD PRESSURE: 81 MMHG | WEIGHT: 165 LBS | BODY MASS INDEX: 30.36 KG/M2 | HEART RATE: 71 BPM | HEIGHT: 62 IN | SYSTOLIC BLOOD PRESSURE: 144 MMHG

## 2019-05-30 DIAGNOSIS — N39.44 NOCTURNAL ENURESIS: Primary | ICD-10-CM

## 2019-05-30 ASSESSMENT — PAIN SCALES - GENERAL: PAINLEVEL: NO PAIN (0)

## 2019-05-30 ASSESSMENT — MIFFLIN-ST. JEOR: SCORE: 1340.02

## 2019-05-30 NOTE — PROGRESS NOTES
"May 30, 2019    Return visit    Patient returns today for follow up to review bladder diary for her nocturnal enuresis.  She is primarily Russian speaking and the entire visit is conducted with the assistance of an  and her  as needed.  She did not do the bladder diary as she worked 15 days straight.  She states that they have had a busy month between work and birthday parties.  She has not had any nocturnal enuresis episodes since last visit. She denies any changes in her health since last visit.    /81   Pulse 71   Ht 1.58 m (5' 2.21\")   Wt 74.8 kg (165 lb)   BMI 29.98 kg/m    She is comfortable, in no distress, non-labored breathing.      A/P: 48 year old F with intermittent nocturnal enuresis    Advised that she keep track of things that may impact her enuresis    RTC 6 months, sooner if needed    15 minutes were spent with the patient today, > 50% in counseling and coordination of care    Aparna Castillo MD MPH   of Urology    CC  Patient Care Team:  Shelby Branch MD as PCP - General (OB/Gyn)  Anna, Aparna Obregon MD as MD (Urology)  Gala Hopper, RN as Specialty Care Coordinator (Urology)  PAMELLA JOSEPH              "

## 2019-05-30 NOTE — NURSING NOTE
"Chief Complaint   Patient presents with     RECHECK     Review bladder diary and discuss symptoms       Blood pressure 144/81, pulse 71, height 1.58 m (5' 2.21\"), weight 74.8 kg (165 lb). Body mass index is 29.98 kg/m .    Patient Active Problem List   Diagnosis     Bilateral dermoid cysts of ovaries     Posterior dislocation of elbow, closed, right, sequela     Iron deficiency anemia, unspecified iron deficiency anemia type     Elevated blood pressure       No Known Allergies    Current Outpatient Medications   Medication Sig Dispense Refill     Acetaminophen (TYLENOL PO)          Social History     Tobacco Use     Smoking status: Never Smoker     Smokeless tobacco: Never Used   Substance Use Topics     Alcohol use: None     Drug use: None       CAROLIN Wright  5/30/2019  3:46 PM     "

## 2019-05-30 NOTE — LETTER
"5/30/2019       RE: Kasandra Dasilva  [address] [address]     Dear Colleague,    Thank you for referring your patient, Kasandra Dasilva, to the [facility] PROSTATE AND UROLOGIC CANCERS at [facility]. Please see a copy of my visit note below.    May 30, 2019    Return visit    Patient returns today for follow up to review bladder diary for her nocturnal enuresis.  She is primarily Maori speaking and the entire visit is conducted with the assistance of an  and her  as needed.  She did not do the bladder diary as she worked 15 days straight.  She states that they have had a busy month between work and birthday parties.  She has not had any nocturnal enuresis episodes since last visit. She denies any changes in her health since last visit.    /81   Pulse 71   Ht 1.58 m (5' 2.21\")   Wt 74.8 kg (165 lb)   BMI 29.98 kg/m     She is comfortable, in no distress, non-labored breathing.      A/P: 48 year old F with intermittent nocturnal enuresis    Advised that she keep track of things that may impact her enuresis    RTC 6 months, sooner if needed    15 minutes were spent with the patient today, > 50% in counseling and coordination of care    Aparna Castillo MD MPH   of Urology    CC  Patient Care Team:  Shelby Barnch MD as PCP - General (OB/Gyn)  Aparna Castillo MD as MD (Urology)  Gala Hopper, RN as Specialty Care Coordinator (Urology)  PAMELLA JOSEPH                "

## 2019-05-30 NOTE — PATIENT INSTRUCTIONS
Continue to monitor your symptoms.  Be mindful of caffeine, alcohol, fluid intake, salt intake and see if any of these things impact the night time leakage    RTC 6 months, sooner if needed    It was a pleasure meeting with you today.  Thank you for allowing me and my team the privilege of caring for you today.  YOU are the reason we are here, and I truly hope we provided you with the excellent service you deserve.  Please let us know if there is anything else we can do for you so that we can be sure you are leaving completely satisfied with your care experience.

## 2019-07-24 ENCOUNTER — OFFICE VISIT (OUTPATIENT)
Dept: FAMILY MEDICINE | Facility: CLINIC | Age: 49
End: 2019-07-24
Payer: COMMERCIAL

## 2019-07-24 VITALS
DIASTOLIC BLOOD PRESSURE: 91 MMHG | TEMPERATURE: 98.5 F | OXYGEN SATURATION: 99 % | WEIGHT: 163.8 LBS | SYSTOLIC BLOOD PRESSURE: 158 MMHG | RESPIRATION RATE: 16 BRPM | HEART RATE: 72 BPM | BODY MASS INDEX: 30.14 KG/M2 | HEIGHT: 62 IN

## 2019-07-24 DIAGNOSIS — R03.0 ELEVATED BLOOD PRESSURE READING WITHOUT DIAGNOSIS OF HYPERTENSION: ICD-10-CM

## 2019-07-24 DIAGNOSIS — K59.01 SLOW TRANSIT CONSTIPATION: Primary | ICD-10-CM

## 2019-07-24 RX ORDER — POLYETHYLENE GLYCOL 3350 17 G/17G
1 POWDER, FOR SOLUTION ORAL DAILY
Qty: 1 BOTTLE | Refills: 11 | Status: SHIPPED | OUTPATIENT
Start: 2019-07-24

## 2019-07-24 SDOH — HEALTH STABILITY: MENTAL HEALTH: HOW OFTEN DO YOU HAVE A DRINK CONTAINING ALCOHOL?: NEVER

## 2019-07-24 ASSESSMENT — MIFFLIN-ST. JEOR: SCORE: 1326.24

## 2019-07-24 NOTE — NURSING NOTE
Due to patient being non-English speaking/uses sign language, an  was used for this visit. Only for face-to-face interpretation by an external agency, date and length of interpretation can be found on the scanned worksheet.     name: Kiet Molina  Agency: [employer]  Language: Kinyarwanda   Telephone number: [phone]  Type of interpretation: Face-to-face, spoken     Keyonna Vazquez CMA on 7/24/2019 at 4:07 PM

## 2019-07-24 NOTE — PATIENT INSTRUCTIONS
Here is the plan from today's visit    1. Slow transit constipation - take the Miralax daily.  If you are not better in 2 - 3 days, then use the suppository. Also, if you get much worse with more pain, fever, vomiting, then go to the ED to be seen for diverticulitis and a CT.   - polyethylene glycol (MIRALAX/GLYCOLAX) powder; Take 17 g (1 capful) by mouth daily  Dispense: 1 Bottle; Refill: 11    2. Elevated blood pressure - please make an appointment to get the Ambulatory Blood Pressure testing to see if you have high blood pressure.       Please call or return to clinic if your symptoms don't go away.    Follow up plan    Thank you for coming to [facility] today.  Lab Testing:  **If you had lab testing today and your results are reassuring or normal they will be mailed to you or sent through [employer] within 7 days.   **If the lab tests need quick action we will call you with the results.  The phone number we will call with results is # [phone] (home) [phone] (work). If this is not the best number please call our clinic and change the number.  Medication Refills:  If you need any refills please call your pharmacy and they will contact us.   If you need to  your refill at a new pharmacy, please contact the new pharmacy directly. The new pharmacy will help you get your medications transferred faster.   Scheduling:  If you have any concerns about today's visit or wish to schedule another appointment please call our office during normal business hours [phone] (8-5:00 M-F)  If a referral was made to a [facility] and you don't get a call from central scheduling please call [phone].  If a Mammogram was ordered for you at [facility] call [phone] to schedule or change your appointment.  If you had an XRay/CT/Ultrasound/MRI ordered the number is [phone] to schedule or change your radiology appointment.   Medical Concerns:  If you have urgent medical  concerns please call [phone] at any time of the day.    Rosana Nash MD      24hr Blood Pressure Monitor   8/1 @ 1:00  [address] [facility] [place] Room

## 2019-07-24 NOTE — PROGRESS NOTES
MORRO Slaughter is a 49 year old  female  who presents for the new concern(s) of:    Chief Complaint   Patient presents with     Vaginal Problem     Had a hysterectomy in May of 2018 due to a tumor and her period has been normal since then but for the last 2 months she has not had one and the whole month of May she was menstrating and she is concerned.       She menstruated the entire month of May - up until 5/30. Since then, she has not had bleeding. Denies any night sweats. She is worried that she might have another cyst, since she also had pain on her left side that somewhat reminds her of her cyst in the past. Aware that she had her left ovary removed.      She also notes more constipation - no bm for the last 2 days which coincides a bit with her belly symptoms. Never had colonoscopy, nor diverticulitis in the past. Low grade temp yesterday that she noted. Pain got better with tylenol.     Urinary incontinence two times in May - wondering if is stress. Had been talking to the family. Always at 3 am.  She has had IBS in the past and also cystitis so thinking she has stress induced symptoms.     5/2018 had cyst removed.     Father had high blood pressure. She has had intermittent elevated pressures that she had someone check her at home and found that often nl. Sometimes up but she thinks happens since she comes rushing in from work.     Work - in a hotel.        A  was used for  this visit.      Patient Active Problem List   Diagnosis     Bilateral dermoid cysts of ovaries     Posterior dislocation of elbow, closed, right, sequela     Iron deficiency anemia, unspecified iron deficiency anemia type     Elevated blood pressure     Nocturnal enuresis       Current Outpatient Medications   Medication Sig Dispense Refill     Acetaminophen (TYLENOL PO)           No Known Allergies             Review of Systems:                 Physical Exam:     Vitals:    07/24/19 1601 07/24/19 1607   BP:  "144/82 (!) 158/91   BP Location: Left arm Right arm   Patient Position: Sitting Sitting   Cuff Size: Adult Regular Adult Regular   Pulse: 72    Resp: 16    Temp: 98.5  F (36.9  C)    TempSrc: Oral    SpO2: 99%    Weight: 74.3 kg (163 lb 12.8 oz)    Height: 1.575 m (5' 2\")      Body mass index is 29.96 kg/m .  Vital signs normal except BP  GENERAL: healthy, alert, well nourished, well hydrated, no distress  RESP: lungs clear to auscultation - no rales, no rhonchi, no wheezes  CV: regular rates and rhythm, normal S1 S2, no S3 or S4 and no murmur, no click or rub -  ABDOMEN: soft, slight LLQ tenderness, without gaurding, no  hepatosplenomegaly, no masses, normal bowel sounds    Results for orders placed or performed in visit on 04/25/19   Urinalysis chemical screen POCT   Result Value Ref Range    Color Urine Yellow     Appearance Urine Clear     Glucose Urine Neg neg mg/dL    Bilirubin Urine Neg neg    Ketones Urine Neg neg mg/dL    Specific Gravity Urine 1.025 1.003 - 1.035    Blood Urine Large neg    pH Urine 5.5 5.0 - 7.0 pH    Protein Albumin Urine 30  neg mg/dL    Urobilinogen Urine 0.2 0.2 - 1.0 EU/dL    Nitrite Urine Neg NEG    Leukocyte Esterase Urine Trace NEG    Source [place]           Assessment and Plan     Kasandra was seen today for vaginal problem.    Diagnoses and all orders for this visit:    Slow transit constipation - will treat with Miralax.  Aware that if not better, she can use a suppository to help. Also, if not better, we will treat her with Cipro/Flagyl for presumptive diverticulitis.  I will call her tomorrow to see how she is doing. She is aware, that if worse, to go to ED for CT. Do not think this is her cyst. If she continues to bleed, will need pelvic US  -     polyethylene glycol (MIRALAX/GLYCOLAX) powder; Take 17 g (1 capful) by mouth daily    Elevated blood pressure reading without diagnosis of hypertension - will see if she has HTN.  Aware of how to get this.   -     24 Hour Blood " Pressure Monitor - Adult; Future    Total time: 30 minutes with more than half spent counseling on whether she has BP and how to manage her LLQ pain.     There are no discontinued medications.    Options for treatment and follow-up care were reviewed with the patient . Kasandra Dasilva  engaged in the decision making process and verbalized understanding of the options discussed and agreed with the final plan.    Rosana Nash MD

## 2019-07-25 ENCOUNTER — TELEPHONE (OUTPATIENT)
Dept: FAMILY MEDICINE | Facility: CLINIC | Age: 49
End: 2019-07-25

## 2019-11-14 ENCOUNTER — PRE VISIT (OUTPATIENT)
Dept: UROLOGY | Facility: CLINIC | Age: 49
End: 2019-11-14

## 2019-11-14 NOTE — TELEPHONE ENCOUNTER
Reason for visit: Review urinary symptoms     Relevant information: nocturnal enuresis     Records/imaging/labs/orders: all records available    Pt called: no need for a call    At Rooming: regular

## 2019-11-21 ENCOUNTER — OFFICE VISIT (OUTPATIENT)
Dept: UROLOGY | Facility: CLINIC | Age: 49
End: 2019-11-21
Payer: COMMERCIAL

## 2019-11-21 VITALS
BODY MASS INDEX: 29.44 KG/M2 | HEIGHT: 62 IN | SYSTOLIC BLOOD PRESSURE: 164 MMHG | WEIGHT: 160 LBS | DIASTOLIC BLOOD PRESSURE: 100 MMHG

## 2019-11-21 DIAGNOSIS — R06.83 SNORING: ICD-10-CM

## 2019-11-21 DIAGNOSIS — N39.44 NOCTURNAL ENURESIS: Primary | ICD-10-CM

## 2019-11-21 ASSESSMENT — MIFFLIN-ST. JEOR: SCORE: 1309.01

## 2019-11-21 NOTE — PATIENT INSTRUCTIONS
Websites with free information:    [employer] patient website: www.voicesforpfd.org    Total Control Program: www.totalcontrolprogram.com    Please do the sleep evaluation    Return to see us as needed [phone]    It was a pleasure meeting with you today.  Thank you for allowing me and my team the privilege of caring for you today.  YOU are the reason we are here, and I truly hope we provided you with the excellent service you deserve.  Please let us know if there is anything else we can do for you so that we can be sure you are leaving completely satisfied with your care experience.

## 2019-11-21 NOTE — PROGRESS NOTES
"May 30, 2019    Return visit    Subjective  Patient returns today for follow up to review bladder diary for her nocturnal enuresis. She is primarily Pashto speaking and the entire visit is conducted with the assistance of an  and her  as needed.    Her enuresis usually occurs between 4-5 am. The frequency is quite random - once or twice a month at most, or even nothing for four months. She is awakened as soon as she is starting to leak, and runs to the bathroom to complete - she is able to hold it and not wet the bed. The urine is not malodorous and there is no hematuria. She has tried to manage this with voiding before beddtime. She has not tried an alarm.     We last saw her 6m ago, and she has a single episode as above. Other than that, she never has to wake up to void.      She is a heavy sleeper, and snores. She's never had a study or a diagnosis of RENETTA.     She is NOT incontinent during the day - neither stress nor urinary incontinence.   She does drink half a cup of coffee a day, soda, and water intake.     Objective  BP (!) 164/100   Ht 1.575 m (5' 2\")   Wt 72.6 kg (160 lb)   BMI 29.26 kg/m    She is comfortable, in no distress, non-labored breathing.      Assessment: 48 year old F with intermittent nocturnal enuresis. This is very rare - once over the past 6 months, and does not result in soaking the bed -- the patient usually wakes up as she is starting to leak and can make it the bathroom. She has no other urinary issues and is overall quite healthy, except for possible RENETTA based on our discussion today.     Plan:   - Offered referral to sleep clinic to investigate RENETTA given its potential association with her symptoms, as well as the other medical risks from RENETTA, including HTN, heart strain, headaches  - Agreed to defer any interventions for her nocturnal enuresis given its very intermittent nature  - Follow-up DANIS Blas,   Urology Resident     Addendum:    The patient was " seen and evaluated with the resident.  The plan was formulated in conjunction with me and I agree with the above note with changes made as necessary.    15 minutes were spent with patient today, >50% in counseling and coordination of care    Aparna Castillo MD MPH   of Urology    CC  Patient Care Team:  Rosana Nash MD as PCP - General (Family Practice)  Aparna Castillo MD as MD (Urology)  Gala Hopper, RN as Specialty Care Coordinator (Urology)  PAMELLA JOSEPH

## 2019-11-21 NOTE — LETTER
"11/21/2019       RE: Kasandra Dasilva  [address] [facility] [address]     Dear Colleague,    Thank you for referring your patient, Kasandra Dasilva, to the [facility] UROLOGY AND INST FOR PROSTATE AND UROLOGIC CANCERS at [facility]. Please see a copy of my visit note below.    May 30, 2019    Return visit    Subjective  Patient returns today for follow up to review bladder diary for her nocturnal enuresis. She is primarily Ivorian speaking and the entire visit is conducted with the assistance of an  and her  as needed.    Her enuresis usually occurs between 4-5 am. The frequency is quite random - once or twice a month at most, or even nothing for four months. She is awakened as soon as she is starting to leak, and runs to the bathroom to complete - she is able to hold it and not wet the bed. The urine is not malodorous and there is no hematuria. She has tried to manage this with voiding before beddtime. She has not tried an alarm.     We last saw her 6m ago, and she has a single episode as above. Other than that, she never has to wake up to void.      She is a heavy sleeper, and snores. She's never had a study or a diagnosis of RENETTA.     She is NOT incontinent during the day - neither stress nor urinary incontinence.   She does drink half a cup of coffee a day, soda, and water intake.     Objective  BP (!) 164/100   Ht 1.575 m (5' 2\")   Wt 72.6 kg (160 lb)   BMI 29.26 kg/m     She is comfortable, in no distress, non-labored breathing.      Assessment: 48 year old F with intermittent nocturnal enuresis. This is very rare - once over the past 6 months, and does not result in soaking the bed -- the patient usually wakes up as she is starting to leak and can make it the bathroom. She has no other urinary issues and is overall quite healthy, except for possible RENETTA based on our discussion today.     Plan:   - Offered referral to sleep " clinic to investigate ERNETTA given its potential association with her symptoms, as well as the other medical risks from RENETTA, including HTN, heart strain, headaches  - Agreed to defer any interventions for her nocturnal enuresis given its very intermittent nature  - Follow-up DANIS Blas,   Urology Resident     Addendum:    The patient was seen and evaluated with the resident.  The plan was formulated in conjunction with me and I agree with the above note with changes made as necessary.    15 minutes were spent with patient today, >50% in counseling and coordination of care    Aparna Castillo MD MPH   of Urology    CC  Patient Care Team:  Rosana Nash MD as PCP - General (Family Practice)  Aparna Castillo MD as MD (Urology)  Gala Hopper, RN as Specialty Care Coordinator (Urology)  PAMELLA JOSEPH

## 2019-11-25 PROBLEM — R06.83 SNORING: Status: ACTIVE | Noted: 2019-11-25

## 2020-05-01 NOTE — PROGRESS NOTES
"      HPI:       Kasandra Cameron is a 47 year old who presents for the following  Patient presents with:  Establish Care: New,   Abdominal Pain: Right side pain, imporovement noticed,concerns   Musculoskeletal Problem: Right arm.limited ROM    Abdominal pain - improving since starting constipation treatment - suppository and tylenol given in ED, sent home with \"a laxative\" that she has not needed. Abdominal pain was not one-sided.     Dermoid Ovarian cyst - discovered on CT at [facility], needs follow up  CT report reviewed in Care Everywhere:   1. 2 fat containing masses, which appear to arise from each ovary, consistent with dermoids that measure 6.4 x 7.7 x 6.6 cm and 7.2 x 10.0 x 8.3 cm. The size of these dermoids increase the risk for ovarian torsion.  2. Diverticulosis with no evidence of diverticulitis  3. Pancreatic ductal dilatation with calcification, likely sequelae of prior pancreatitis.  LMP 18. Has had 3 pregnancies and 3 term deliveries in , , and . Denies any complications with these pregnancies. Denies ever having a .     Right arm  Fell 7 years ago, thinks that she dislocated elbow, doesn't think it was broken. Did not see a doctor (was living in [place]). Painful initially, but now has gotten used to it and pain has resolved. However still cannot bend her elbow, it stays straight. Is right handed    Patient accompanied by her daughter    A  was used for  this visit.      Problem, Medication and Allergy Lists were   reviewed and are current.       Current Outpatient Prescriptions   Medication Sig Dispense Refill     Acetaminophen (TYLENOL PO)      ,   Allergies no known allergies  Patient is   a new patient to this clinic and so  I reviewed/updated the Past Medical History, the Family History and the Social History. , History reviewed. No pertinent past medical history., Has never had surgery  Family History     Problem (# of Occurrences) Relation (Name,Age of " "Onset)    DIABETES (1) Father (77)       and   Social History     Social History     Marital status:      Spouse name: N/A     Number of children: N/A     Years of education: N/A     Social History Main Topics     Smoking status: Never Smoker     Smokeless tobacco: Never Used     Alcohol use None     Drug use: None     Sexual activity: Not Asked     Other Topics Concern     None     Social History Narrative    Lives with 3 children and . 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   HENT: Negative for congestion, ear pain, rhinorrhea and sore throat.    Eyes: Negative for pain and visual disturbance.   Respiratory: Negative for cough, shortness of breath and wheezing.    Cardiovascular: Negative for chest pain, palpitations and leg swelling.   Gastrointestinal: Negative for abdominal pain, constipation, diarrhea, nausea and vomiting.   Endocrine: Negative for cold intolerance, heat intolerance, polydipsia and polyuria.   Genitourinary: Negative for dysuria and vaginal discharge.   Musculoskeletal: Negative for arthralgias, back pain and neck pain.   Skin: Negative for rash.   Neurological: Negative for dizziness, weakness, numbness and headaches.   Hematological: Does not bruise/bleed easily.   Psychiatric/Behavioral: Negative for dysphoric mood and sleep disturbance. The patient is not nervous/anxious.              Physical Exam:   Patient Vitals for the past 24 hrs:   BP Temp Temp src Pulse Resp SpO2 Height Weight   04/10/18 0906 133/76 98.4  F (36.9  C) Oral 78 18 99 % 5' 2\" (157.5 cm) 166 lb (75.3 kg)     Body mass index is 30.36 kg/(m^2).     Physical Exam   Constitutional: She appears well-developed and well-nourished.   HENT:   Head: Normocephalic and atraumatic.   Eyes: Conjunctivae are normal.   Cardiovascular: Normal rate.    Pulmonary/Chest: Effort normal. No respiratory distress. "   Musculoskeletal:        Right elbow: She exhibits decreased range of motion and deformity. She exhibits no swelling. No tenderness found.   Bony protuberance on medial aspect of elbow. Passive and active flexion limited to 160 degrees   Skin: Skin is warm and dry. No rash noted. No erythema. No pallor.   Vitals reviewed.      Results:     3 views right elbow radiographs 4/10/2018 10:40 AM     History: injury 7 years ago, unable to flex elbow; Decreased range of  motion of right elbow. As per chart review: Fell 7 years ago and had  pain initially pass in standard pain has been resolved. Patient  continue to have limited range of motion     Comparison: None     Findings:     AP and lateral views of the right elbow were obtained. Marked medial  and posteriorly dislocated radial head and is now medial to the ulnar  head. The ulnar is also posteriorly dislocated relative to the  trochlear. Trochlear body and olecranon groove demonstrate surface  irregularity likely secondary to unaligned healing. There is bony  fragments medial to the medial epicondyle and proximal to the  capitulum, likely from prior comminuted fracture from medial  epicondyle fracture.          Impression:  1. Radial head is marked medial and posteriorly dislocated and is now  medial to the ulnar.  2. Posterior dislocation of ulnar  3. Multiple bony fragments in the joint space likely resulted from  prior fracture. See above for detail    Assessment and Plan     Here is the plan from today's visit    1. Bilateral dermoid cysts of ovaries  - OB/GYN REFERRAL - INTERNAL - [facility]    2. Decreased range of motion of right elbow  Untreated elbow dislocation and likely fracture  - XR ELBOW RT G/E 3 VW; Future  - [facility] INTER[facility] REFERRAL    Healthcare maintenance  Conversation about contraception. She declines to use anything.       There are no discontinued medications.  Options for treatment and follow-up care were reviewed with the  patient. Kasandra Cameron  engaged in the decision making process and verbalized understanding of the options discussed and agreed with the final plan.    I spent 30 min face to face with the patient and >50% was spent counselling the patient about the above medical conditions, educating patient and discussing the recommendations and followup.    Myada Chamorro MD   [facility]      [As Noted in HPI] : as noted in HPI [Negative] : Heme/Lymph

## 2020-09-02 NOTE — BRIEF OP NOTE
[facility]  Brief Operative Note    Date of Surgery: 5/18/2018    Preop Dx:  Bilateral ovarian dermoid cysts     Postop Dx:   Bilateral ovarian dermoid cysts     Left ovarian torsion    Procedure:   Mini laparotomy, Left oophorectomy, bilateral salpingectomy, right ovarian cystectomy, pelvic exam and    pap smear    Surgeon:   Shelby Hunt MD    Assistants:   Alicia Myhre, DO PGY-1     Melissa Wilkerson MD, PGY-4     Rebecca Christianson MS4    Anesthesia:  General    EBL:  20 cc    IVF:  1150 cc    UOP:  240 cc    Drains: Delcid placed     Specimen:  Left ovary and cyst, bilateral fallopian tubes, right ovarian cyst     Complications: None apparent     Findings: Cervix normal appearing. Pap smear collected. On entry into abdomen, no evidence of injury and normal appearing appendix and uterus. Evidence of left ovarian torsion with left ovary fully encompassed with ~10cm dermoid cyst with smooth walls and septations with hair and sebaceous fluid visible within the cyst. The right ovary had ~7cm dermoid appearing cyst as well but was able to remove the dermoid cyst and leave normal appearing ovary. Fimbrillar used within the ovarian cystectomy site for hemostasis and right ovary cystectomy site re-approximated. Bilateral normal appearance to the fallopian tubes.      Disposition:  Transferred in stable condition to the PACU    Alicia Myhre, DO  Obstetrics and Gyncology, PGY-1  May 18, 2018 , 9:42 AM          
Psych/Behavioral
